# Patient Record
Sex: FEMALE | URBAN - NONMETROPOLITAN AREA
[De-identification: names, ages, dates, MRNs, and addresses within clinical notes are randomized per-mention and may not be internally consistent; named-entity substitution may affect disease eponyms.]

---

## 2022-08-03 ENCOUNTER — APPOINTMENT (RX ONLY)
Dept: URBAN - NONMETROPOLITAN AREA CLINIC 25 | Facility: CLINIC | Age: 62
Setting detail: DERMATOLOGY
End: 2022-08-03

## 2022-08-03 DIAGNOSIS — Z00.8 ENCOUNTER FOR OTHER GENERAL EXAMINATION: ICD-10-CM

## 2022-08-03 DIAGNOSIS — L60.8 OTHER NAIL DISORDERS: ICD-10-CM

## 2022-08-03 PROBLEM — L60.3 NAIL DYSTROPHY: Status: ACTIVE | Noted: 2022-08-03

## 2022-08-03 PROCEDURE — 11104 PUNCH BX SKIN SINGLE LESION: CPT

## 2022-08-03 PROCEDURE — ? ADDITIONAL NOTES

## 2022-08-03 PROCEDURE — ? OTHER

## 2022-08-03 PROCEDURE — ? COUNSELING

## 2022-08-03 PROCEDURE — ? BIOPSY BY PUNCH METHOD

## 2022-08-03 PROCEDURE — 99203 OFFICE O/P NEW LOW 30 MIN: CPT | Mod: 25

## 2022-08-03 PROCEDURE — ? PRESCRIPTION

## 2022-08-03 PROCEDURE — ? TREATMENT REGIMEN

## 2022-08-03 RX ORDER — GENTAMICIN SULFATE 1 MG/G
OINTMENT TOPICAL
Qty: 30 | Refills: 2 | Status: ERX | COMMUNITY
Start: 2022-08-03

## 2022-08-03 RX ADMIN — GENTAMICIN SULFATE: 1 OINTMENT TOPICAL at 00:00

## 2022-08-03 ASSESSMENT — LOCATION DETAILED DESCRIPTION DERM: LOCATION DETAILED: LEFT RING FINGERNAIL

## 2022-08-03 ASSESSMENT — LOCATION SIMPLE DESCRIPTION DERM: LOCATION SIMPLE: LEFT RING FINGERNAIL

## 2022-08-03 ASSESSMENT — LOCATION ZONE DERM: LOCATION ZONE: FINGERNAIL

## 2022-08-03 NOTE — PROCEDURE: TREATMENT REGIMEN
Detail Level: Zone
Initiate Treatment: Gentamicin apply twice daily under the nail daily until resolved

## 2022-08-03 NOTE — PROCEDURE: MIPS QUALITY
Quality 431: Preventive Care And Screening: Unhealthy Alcohol Use - Screening: Patient not identified as an unhealthy alcohol user when screened for unhealthy alcohol use using a systematic screening method
Quality 110: Preventive Care And Screening: Influenza Immunization: Influenza Immunization previously received during influenza season
Quality 226: Preventive Care And Screening: Tobacco Use: Screening And Cessation Intervention: Patient screened for tobacco use and is an ex/non-smoker
Quality 111:Pneumonia Vaccination Status For Older Adults: Pneumococcal vaccine administered on or after patient’s 60th birthday and before the end of the measurement period
Detail Level: Detailed
Quality 130: Documentation Of Current Medications In The Medical Record: Current Medications Documented

## 2022-08-03 NOTE — HPI: NAIL DYSTROPHY
How Severe Is It?: severe
Is This A New Presentation, Or A Follow-Up?: Nail Dystrophy
Additional History: Nail was removed in January 2022 at Urgent Care due to traumatic event that caused the nail to be partially off. This is how the nail grew back

## 2022-08-03 NOTE — PROCEDURE: BIOPSY BY PUNCH METHOD
Detail Level: Detailed
Was A Bandage Applied: Yes
Punch Size In Mm: 3
Biopsy Type: H and E
Anesthesia Type: 1% lidocaine with epinephrine
Anesthesia Volume In Cc (Will Not Render If 0): 0.5
Additional Anesthesia Volume In Cc (Will Not Render If 0): 0
Hemostasis: None
Epidermal Sutures: none, closed by secondary intention
Wound Care: Petrolatum
Dressing: bandage
Patient Will Remove Sutures At Home?: No
Lab: [id number]
Lab Facility: [id number]
Consent: Written consent was obtained and risks were reviewed including but not limited to scarring, infection, bleeding, scabbing, incomplete removal, nerve damage and allergy to anesthesia.
Post-Care Instructions: I reviewed with the patient in detail post-care instructions. Patient is to keep the biopsy site dry overnight, and then apply bacitracin twice daily until healed. Patient may apply hydrogen peroxide soaks to remove any crusting.
Home Suture Removal Text: Patient was provided a home suture removal kit and will remove their sutures at home.  If they have any questions or difficulties they will call the office.
Notification Instructions: Patient will be notified of biopsy results. However, patient instructed to call the office if not contacted within 2 weeks.
Billing Type: Third-Party Bill
Information: Selecting Yes will display possible errors in your note based on the variables you have selected. This validation is only offered as a suggestion for you. PLEASE NOTE THAT THE VALIDATION TEXT WILL BE REMOVED WHEN YOU FINALIZE YOUR NOTE. IF YOU WANT TO FAX A PRELIMINARY NOTE YOU WILL NEED TO TOGGLE THIS TO 'NO' IF YOU DO NOT WANT IT IN YOUR FAXED NOTE.

## 2022-08-03 NOTE — PROCEDURE: ADDITIONAL NOTES
Additional Notes: \\nDiscussed ddx and treatment options at length.  Patient admits to traumatizing nail ~7-8 months ago, ultimately requiring avulsion of nail.  The nail plate regrew appropriately, but she has developed a tender, hyperpigmented patch on the right, mid nail plate.  We discussed repeat avulsion vs punch through the nail plate for definitive diagnosis, and together agreed to perform punch through the nail plate.  Immediately upon penetrating through the nail plate, a subungual hematoma was released with a healthy appearing nail bed beneath.  Nail plate submitted for pathology.
Render Risk Assessment In Note?: no
Detail Level: Simple

## 2022-08-09 ENCOUNTER — APPOINTMENT (RX ONLY)
Dept: URBAN - NONMETROPOLITAN AREA CLINIC 25 | Facility: CLINIC | Age: 62
Setting detail: DERMATOLOGY
End: 2022-08-09

## 2022-08-09 DIAGNOSIS — Z00.8 ENCOUNTER FOR OTHER GENERAL EXAMINATION: ICD-10-CM

## 2022-08-09 DIAGNOSIS — Z48.817 ENCOUNTER FOR SURGICAL AFTERCARE FOLLOWING SURGERY ON THE SKIN AND SUBCUTANEOUS TISSUE: ICD-10-CM

## 2022-08-09 PROCEDURE — ? COUNSELING

## 2022-08-09 PROCEDURE — ? OTHER

## 2022-08-09 PROCEDURE — 99024 POSTOP FOLLOW-UP VISIT: CPT

## 2022-08-09 PROCEDURE — ? POST-OP WOUND CHECK

## 2022-08-09 PROCEDURE — ? PRESCRIPTION

## 2022-08-09 RX ORDER — GENTAMICIN SULFATE 1 MG/G
OINTMENT TOPICAL
Qty: 15 | Refills: 2 | Status: ERX

## 2022-08-09 ASSESSMENT — LOCATION SIMPLE DESCRIPTION DERM: LOCATION SIMPLE: LEFT THUMBNAIL

## 2022-08-09 ASSESSMENT — LOCATION ZONE DERM: LOCATION ZONE: FINGERNAIL

## 2022-08-09 ASSESSMENT — LOCATION DETAILED DESCRIPTION DERM: LOCATION DETAILED: LEFT THUMBNAIL

## 2022-08-09 NOTE — PROCEDURE: POST-OP WOUND CHECK
Detail Level: Detailed
Add 43982 Cpt? (Important Note: In 2017 The Use Of 04107 Is Being Tracked By Cms To Determine Future Global Period Reimbursement For Global Periods): yes
Wound Evaluated By: Dr. Cohn

## 2022-08-11 ENCOUNTER — RX ONLY (OUTPATIENT)
Age: 62
Setting detail: RX ONLY
End: 2022-08-11

## 2022-08-11 RX ORDER — GENTAMICIN SULFATE 1 MG/G
OINTMENT TOPICAL
Qty: 30 | Refills: 2 | Status: ERX

## 2023-02-21 ENCOUNTER — APPOINTMENT (RX ONLY)
Dept: URBAN - NONMETROPOLITAN AREA CLINIC 25 | Facility: CLINIC | Age: 63
Setting detail: DERMATOLOGY
End: 2023-02-21

## 2023-02-21 DIAGNOSIS — L60.8 OTHER NAIL DISORDERS: ICD-10-CM | Status: INADEQUATELY CONTROLLED

## 2023-02-21 PROCEDURE — ? COUNSELING

## 2023-02-21 PROCEDURE — 99213 OFFICE O/P EST LOW 20 MIN: CPT

## 2023-02-21 PROCEDURE — ? TREATMENT REGIMEN

## 2023-02-21 ASSESSMENT — LOCATION SIMPLE DESCRIPTION DERM: LOCATION SIMPLE: LEFT THUMBNAIL

## 2023-02-21 ASSESSMENT — NAIL INVOLVEMENT PERCENT: % OF NAIL(S) INVOLVED: 10

## 2023-02-21 ASSESSMENT — LOCATION ZONE DERM: LOCATION ZONE: FINGERNAIL

## 2023-02-21 ASSESSMENT — LOCATION DETAILED DESCRIPTION DERM: LOCATION DETAILED: LEFT THUMBNAIL

## 2023-02-21 NOTE — PROCEDURE: TREATMENT REGIMEN
Detail Level: Detailed
Otc Regimen: 50/50 Hydrogen peroxide and isopropyl alcohol apply medication each time hands are exposed to water.

## 2023-04-05 ENCOUNTER — HOSPITAL ENCOUNTER (INPATIENT)
Facility: HOSPITAL | Age: 63
LOS: 8 days | Discharge: HOME HEALTH | DRG: 871 | End: 2023-04-13
Attending: STUDENT IN AN ORGANIZED HEALTH CARE EDUCATION/TRAINING PROGRAM | Admitting: INTERNAL MEDICINE
Payer: MEDICARE

## 2023-04-05 DIAGNOSIS — R10.84 GENERALIZED ABDOMINAL PAIN: Primary | ICD-10-CM

## 2023-04-05 DIAGNOSIS — R57.9 SHOCK: ICD-10-CM

## 2023-04-05 DIAGNOSIS — A41.9 SEPSIS, DUE TO UNSPECIFIED ORGANISM, UNSPECIFIED WHETHER ACUTE ORGAN DYSFUNCTION PRESENT: ICD-10-CM

## 2023-04-05 DIAGNOSIS — A04.72 C. DIFFICILE COLITIS: ICD-10-CM

## 2023-04-05 PROBLEM — R19.7 DIARRHEA: Status: ACTIVE | Noted: 2023-04-05

## 2023-04-05 PROBLEM — E87.1 HYPONATREMIA: Status: ACTIVE | Noted: 2023-04-05

## 2023-04-05 PROBLEM — D64.9 ANEMIA: Status: ACTIVE | Noted: 2023-04-05

## 2023-04-05 PROBLEM — N17.9 AKI (ACUTE KIDNEY INJURY): Status: ACTIVE | Noted: 2023-04-05

## 2023-04-05 PROBLEM — Z98.1 S/P LUMBAR FUSION: Status: ACTIVE | Noted: 2023-04-05

## 2023-04-05 PROBLEM — E88.09 HYPOALBUMINEMIA: Status: ACTIVE | Noted: 2023-04-05

## 2023-04-05 PROBLEM — E83.39 HYPERPHOSPHATEMIA: Status: ACTIVE | Noted: 2023-04-05

## 2023-04-05 PROBLEM — E87.29 INCREASED ANION GAP METABOLIC ACIDOSIS: Status: ACTIVE | Noted: 2023-04-05

## 2023-04-05 LAB
ALBUMIN SERPL BCP-MCNC: 1.9 G/DL (ref 3.5–5.2)
ALLENS TEST: ABNORMAL
ALLENS TEST: NORMAL
ALP SERPL-CCNC: 99 U/L (ref 55–135)
ALT SERPL W/O P-5'-P-CCNC: 39 U/L (ref 10–44)
ANION GAP SERPL CALC-SCNC: 17 MMOL/L (ref 8–16)
ANISOCYTOSIS BLD QL SMEAR: SLIGHT
AST SERPL-CCNC: 64 U/L (ref 10–40)
BACTERIA #/AREA URNS AUTO: ABNORMAL /HPF
BASOPHILS # BLD AUTO: 0.1 K/UL (ref 0–0.2)
BASOPHILS NFR BLD: 0.8 % (ref 0–1.9)
BILIRUB SERPL-MCNC: 0.5 MG/DL (ref 0.1–1)
BILIRUB UR QL STRIP: NEGATIVE
BUN SERPL-MCNC: 117 MG/DL (ref 8–23)
BURR CELLS BLD QL SMEAR: ABNORMAL
CALCIUM SERPL-MCNC: 8.2 MG/DL (ref 8.7–10.5)
CHLORIDE SERPL-SCNC: 90 MMOL/L (ref 95–110)
CLARITY UR REFRACT.AUTO: ABNORMAL
CO2 SERPL-SCNC: 16 MMOL/L (ref 23–29)
COLOR UR AUTO: YELLOW
CREAT SERPL-MCNC: 7 MG/DL (ref 0.5–1.4)
DACRYOCYTES BLD QL SMEAR: ABNORMAL
DIFFERENTIAL METHOD: ABNORMAL
EOSINOPHIL # BLD AUTO: 0 K/UL (ref 0–0.5)
EOSINOPHIL NFR BLD: 0 % (ref 0–8)
ERYTHROCYTE [DISTWIDTH] IN BLOOD BY AUTOMATED COUNT: 14 % (ref 11.5–14.5)
EST. GFR  (NO RACE VARIABLE): 6.1 ML/MIN/1.73 M^2
GLUCOSE SERPL-MCNC: 109 MG/DL (ref 70–110)
GLUCOSE UR QL STRIP: NEGATIVE
HCO3 UR-SCNC: 17.4 MMOL/L (ref 24–28)
HCT VFR BLD AUTO: 31.1 % (ref 37–48.5)
HCV AB SERPL QL IA: NORMAL
HGB BLD-MCNC: 10.3 G/DL (ref 12–16)
HGB UR QL STRIP: ABNORMAL
HIV 1+2 AB+HIV1 P24 AG SERPL QL IA: NORMAL
HYALINE CASTS UR QL AUTO: 9 /LPF
HYPOCHROMIA BLD QL SMEAR: ABNORMAL
IMM GRANULOCYTES # BLD AUTO: 0.62 K/UL (ref 0–0.04)
IMM GRANULOCYTES NFR BLD AUTO: 4.7 % (ref 0–0.5)
INFLUENZA A, MOLECULAR: NEGATIVE
INFLUENZA B, MOLECULAR: NEGATIVE
KETONES UR QL STRIP: NEGATIVE
LACTATE SERPL-SCNC: 2.2 MMOL/L (ref 0.5–2.2)
LACTATE SERPL-SCNC: 2.4 MMOL/L (ref 0.5–2.2)
LDH SERPL L TO P-CCNC: 1.98 MMOL/L (ref 0.5–2.2)
LEUKOCYTE ESTERASE UR QL STRIP: NEGATIVE
LIPASE SERPL-CCNC: 9 U/L (ref 4–60)
LYMPHOCYTES # BLD AUTO: 0.9 K/UL (ref 1–4.8)
LYMPHOCYTES NFR BLD: 6.5 % (ref 18–48)
MAGNESIUM SERPL-MCNC: 2.1 MG/DL (ref 1.6–2.6)
MCH RBC QN AUTO: 25.8 PG (ref 27–31)
MCHC RBC AUTO-ENTMCNC: 33.1 G/DL (ref 32–36)
MCV RBC AUTO: 78 FL (ref 82–98)
MICROSCOPIC COMMENT: ABNORMAL
MONOCYTES # BLD AUTO: 1.1 K/UL (ref 0.3–1)
MONOCYTES NFR BLD: 8.5 % (ref 4–15)
NEUTROPHILS # BLD AUTO: 10.5 K/UL (ref 1.8–7.7)
NEUTROPHILS NFR BLD: 79.5 % (ref 38–73)
NITRITE UR QL STRIP: NEGATIVE
NRBC BLD-RTO: 0 /100 WBC
OSMOLALITY UR: 307 MOSM/KG (ref 50–1200)
OVALOCYTES BLD QL SMEAR: ABNORMAL
PCO2 BLDA: 31.9 MMHG (ref 35–45)
PH SMN: 7.34 [PH] (ref 7.35–7.45)
PH UR STRIP: 5 [PH] (ref 5–8)
PHOSPHATE SERPL-MCNC: 6.1 MG/DL (ref 2.7–4.5)
PLATELET # BLD AUTO: 383 K/UL (ref 150–450)
PLATELET BLD QL SMEAR: ABNORMAL
PMV BLD AUTO: 11.8 FL (ref 9.2–12.9)
PO2 BLDA: 28 MMHG (ref 40–60)
POC BE: -8 MMOL/L
POC SATURATED O2: 51 % (ref 95–100)
POC TCO2: 18 MMOL/L (ref 24–29)
POIKILOCYTOSIS BLD QL SMEAR: SLIGHT
POLYCHROMASIA BLD QL SMEAR: ABNORMAL
POTASSIUM SERPL-SCNC: 4.2 MMOL/L (ref 3.5–5.1)
PROT SERPL-MCNC: 5.8 G/DL (ref 6–8.4)
PROT UR QL STRIP: ABNORMAL
RBC # BLD AUTO: 3.99 M/UL (ref 4–5.4)
RBC #/AREA URNS AUTO: 1 /HPF (ref 0–4)
SAMPLE: ABNORMAL
SAMPLE: NORMAL
SARS-COV-2 RDRP RESP QL NAA+PROBE: NEGATIVE
SITE: ABNORMAL
SITE: NORMAL
SODIUM SERPL-SCNC: 123 MMOL/L (ref 136–145)
SODIUM UR-SCNC: <10 MMOL/L (ref 20–250)
SP GR UR STRIP: 1.01 (ref 1–1.03)
SPECIMEN SOURCE: NORMAL
T4 FREE SERPL-MCNC: 0.8 NG/DL (ref 0.71–1.51)
TSH SERPL DL<=0.005 MIU/L-ACNC: 0.37 UIU/ML (ref 0.4–4)
URN SPEC COLLECT METH UR: ABNORMAL
WBC # BLD AUTO: 13.15 K/UL (ref 3.9–12.7)
WBC #/AREA URNS AUTO: 1 /HPF (ref 0–5)

## 2023-04-05 PROCEDURE — 63600175 PHARM REV CODE 636 W HCPCS: Performed by: STUDENT IN AN ORGANIZED HEALTH CARE EDUCATION/TRAINING PROGRAM

## 2023-04-05 PROCEDURE — 84300 ASSAY OF URINE SODIUM: CPT | Performed by: STUDENT IN AN ORGANIZED HEALTH CARE EDUCATION/TRAINING PROGRAM

## 2023-04-05 PROCEDURE — 81001 URINALYSIS AUTO W/SCOPE: CPT | Performed by: STUDENT IN AN ORGANIZED HEALTH CARE EDUCATION/TRAINING PROGRAM

## 2023-04-05 PROCEDURE — U0002 COVID-19 LAB TEST NON-CDC: HCPCS

## 2023-04-05 PROCEDURE — 83605 ASSAY OF LACTIC ACID: CPT | Mod: 91

## 2023-04-05 PROCEDURE — 20000000 HC ICU ROOM

## 2023-04-05 PROCEDURE — 99900035 HC TECH TIME PER 15 MIN (STAT)

## 2023-04-05 PROCEDURE — 87389 HIV-1 AG W/HIV-1&-2 AB AG IA: CPT | Performed by: PHYSICIAN ASSISTANT

## 2023-04-05 PROCEDURE — 84439 ASSAY OF FREE THYROXINE: CPT | Performed by: STUDENT IN AN ORGANIZED HEALTH CARE EDUCATION/TRAINING PROGRAM

## 2023-04-05 PROCEDURE — 87502 INFLUENZA DNA AMP PROBE: CPT

## 2023-04-05 PROCEDURE — 63600175 PHARM REV CODE 636 W HCPCS: Performed by: NURSE PRACTITIONER

## 2023-04-05 PROCEDURE — 84100 ASSAY OF PHOSPHORUS: CPT | Performed by: STUDENT IN AN ORGANIZED HEALTH CARE EDUCATION/TRAINING PROGRAM

## 2023-04-05 PROCEDURE — 99285 EMERGENCY DEPT VISIT HI MDM: CPT | Mod: CS,,, | Performed by: STUDENT IN AN ORGANIZED HEALTH CARE EDUCATION/TRAINING PROGRAM

## 2023-04-05 PROCEDURE — 84443 ASSAY THYROID STIM HORMONE: CPT | Performed by: STUDENT IN AN ORGANIZED HEALTH CARE EDUCATION/TRAINING PROGRAM

## 2023-04-05 PROCEDURE — 27000207 HC ISOLATION

## 2023-04-05 PROCEDURE — S0030 INJECTION, METRONIDAZOLE: HCPCS

## 2023-04-05 PROCEDURE — 96361 HYDRATE IV INFUSION ADD-ON: CPT

## 2023-04-05 PROCEDURE — 83605 ASSAY OF LACTIC ACID: CPT

## 2023-04-05 PROCEDURE — 87040 BLOOD CULTURE FOR BACTERIA: CPT | Mod: 59 | Performed by: STUDENT IN AN ORGANIZED HEALTH CARE EDUCATION/TRAINING PROGRAM

## 2023-04-05 PROCEDURE — 25000003 PHARM REV CODE 250: Performed by: NURSE PRACTITIONER

## 2023-04-05 PROCEDURE — 96374 THER/PROPH/DIAG INJ IV PUSH: CPT

## 2023-04-05 PROCEDURE — 82803 BLOOD GASES ANY COMBINATION: CPT

## 2023-04-05 PROCEDURE — 86803 HEPATITIS C AB TEST: CPT | Performed by: PHYSICIAN ASSISTANT

## 2023-04-05 PROCEDURE — 83735 ASSAY OF MAGNESIUM: CPT | Performed by: STUDENT IN AN ORGANIZED HEALTH CARE EDUCATION/TRAINING PROGRAM

## 2023-04-05 PROCEDURE — 85025 COMPLETE CBC W/AUTO DIFF WBC: CPT

## 2023-04-05 PROCEDURE — 83605 ASSAY OF LACTIC ACID: CPT | Performed by: STUDENT IN AN ORGANIZED HEALTH CARE EDUCATION/TRAINING PROGRAM

## 2023-04-05 PROCEDURE — 99285 EMERGENCY DEPT VISIT HI MDM: CPT | Mod: 25

## 2023-04-05 PROCEDURE — 25000003 PHARM REV CODE 250: Performed by: STUDENT IN AN ORGANIZED HEALTH CARE EDUCATION/TRAINING PROGRAM

## 2023-04-05 PROCEDURE — 83935 ASSAY OF URINE OSMOLALITY: CPT | Performed by: STUDENT IN AN ORGANIZED HEALTH CARE EDUCATION/TRAINING PROGRAM

## 2023-04-05 PROCEDURE — 99285 PR EMERGENCY DEPT VISIT,LEVEL V: ICD-10-PCS | Mod: CS,,, | Performed by: STUDENT IN AN ORGANIZED HEALTH CARE EDUCATION/TRAINING PROGRAM

## 2023-04-05 PROCEDURE — 51702 INSERT TEMP BLADDER CATH: CPT

## 2023-04-05 PROCEDURE — 80053 COMPREHEN METABOLIC PANEL: CPT | Performed by: STUDENT IN AN ORGANIZED HEALTH CARE EDUCATION/TRAINING PROGRAM

## 2023-04-05 PROCEDURE — 25000003 PHARM REV CODE 250

## 2023-04-05 PROCEDURE — 83690 ASSAY OF LIPASE: CPT | Performed by: STUDENT IN AN ORGANIZED HEALTH CARE EDUCATION/TRAINING PROGRAM

## 2023-04-05 RX ORDER — SODIUM CHLORIDE 9 MG/ML
INJECTION, SOLUTION INTRAVENOUS CONTINUOUS
Status: DISCONTINUED | OUTPATIENT
Start: 2023-04-05 | End: 2023-04-05

## 2023-04-05 RX ORDER — NOREPINEPHRINE BITARTRATE/D5W 4MG/250ML
0-.2 PLASTIC BAG, INJECTION (ML) INTRAVENOUS CONTINUOUS
Status: DISPENSED | OUTPATIENT
Start: 2023-04-05 | End: 2023-04-06

## 2023-04-05 RX ORDER — ONDANSETRON 2 MG/ML
4 INJECTION INTRAMUSCULAR; INTRAVENOUS EVERY 8 HOURS PRN
Status: DISCONTINUED | OUTPATIENT
Start: 2023-04-05 | End: 2023-04-13 | Stop reason: HOSPADM

## 2023-04-05 RX ORDER — SODIUM CHLORIDE 0.9 % (FLUSH) 0.9 %
10 SYRINGE (ML) INJECTION
Status: DISCONTINUED | OUTPATIENT
Start: 2023-04-05 | End: 2023-04-13 | Stop reason: HOSPADM

## 2023-04-05 RX ORDER — SODIUM CHLORIDE, SODIUM LACTATE, POTASSIUM CHLORIDE, CALCIUM CHLORIDE 600; 310; 30; 20 MG/100ML; MG/100ML; MG/100ML; MG/100ML
INJECTION, SOLUTION INTRAVENOUS CONTINUOUS
Status: DISCONTINUED | OUTPATIENT
Start: 2023-04-05 | End: 2023-04-11

## 2023-04-05 RX ORDER — NOREPINEPHRINE BITARTRATE/D5W 4MG/250ML
0-3 PLASTIC BAG, INJECTION (ML) INTRAVENOUS CONTINUOUS
Status: DISCONTINUED | OUTPATIENT
Start: 2023-04-05 | End: 2023-04-05

## 2023-04-05 RX ORDER — METRONIDAZOLE 500 MG/100ML
500 INJECTION, SOLUTION INTRAVENOUS
Status: DISCONTINUED | OUTPATIENT
Start: 2023-04-05 | End: 2023-04-05

## 2023-04-05 RX ADMIN — NOREPINEPHRINE BITARTRATE 0.08 MCG/KG/MIN: 4 INJECTION, SOLUTION INTRAVENOUS at 09:04

## 2023-04-05 RX ADMIN — METRONIDAZOLE 500 MG: 500 INJECTION, SOLUTION INTRAVENOUS at 06:04

## 2023-04-05 RX ADMIN — ONDANSETRON 4 MG: 2 INJECTION INTRAMUSCULAR; INTRAVENOUS at 10:04

## 2023-04-05 RX ADMIN — SODIUM CHLORIDE, POTASSIUM CHLORIDE, SODIUM LACTATE AND CALCIUM CHLORIDE 1000 ML: 600; 310; 30; 20 INJECTION, SOLUTION INTRAVENOUS at 05:04

## 2023-04-05 RX ADMIN — SODIUM CHLORIDE, POTASSIUM CHLORIDE, SODIUM LACTATE AND CALCIUM CHLORIDE 1000 ML: 600; 310; 30; 20 INJECTION, SOLUTION INTRAVENOUS at 07:04

## 2023-04-05 RX ADMIN — SODIUM CHLORIDE, POTASSIUM CHLORIDE, SODIUM LACTATE AND CALCIUM CHLORIDE: 600; 310; 30; 20 INJECTION, SOLUTION INTRAVENOUS at 10:04

## 2023-04-05 RX ADMIN — NOREPINEPHRINE BITARTRATE 0.05 MCG/KG/MIN: 4 INJECTION, SOLUTION INTRAVENOUS at 07:04

## 2023-04-05 RX ADMIN — PIPERACILLIN SODIUM AND TAZOBACTAM SODIUM 4.5 G: 4; .5 INJECTION, POWDER, FOR SOLUTION INTRAVENOUS at 08:04

## 2023-04-05 NOTE — ED PROVIDER NOTES
Encounter Date: 4/5/2023     History     Chief Complaint   Patient presents with    Abdominal Pain     Recent back surgery on 3/30, now having nausea and weakness.      Ms. Zelaya is a 63 YOF with HTN, WILMA, and recent L4-5 posterior decompression and fusion on 3/20 in [place], [place] for chronic LBP. Pt is coming in for abdominal pain, diarrhea, and delayed speech. Pt presents with daughter who provides part of history. Abdominal pain onset 5:30 AM today, location epigastric, described as squeezing, intensity 8/10. Pt is having 6-7 BM per day, consistency liquid, no blood. Pt had L4-5 posterior decompression and fusion on 3/20, was hospitalized from 3/20-3/27, was at rehab facility 3/27-3/31. Pt then living with faua in [facility] since. At rehab facility several members at the facility contracted diarrhea and had to be admitted.  ROS positive for decreased PO intake (last ate food and medications 5:30 PM last night), sore throat, and urinary frequency. ROS negative for hematuria, urgency, CP, SOB, palpitations, N/V, dizziness, lightheadedness, tunnel vision, LOC, cough, fevers, chills.     The history is provided by the patient and a relative. No  was used.   Review of patient's allergies indicates:  No Known Allergies  History reviewed. No pertinent past medical history.  Past Surgical History:   Procedure Laterality Date    BACK SURGERY      CHOLECYSTECTOMY       History reviewed. No pertinent family history.  Social History     Tobacco Use    Smoking status: Never    Smokeless tobacco: Never   Substance Use Topics    Alcohol use: Never     Review of Systems    Physical Exam     Initial Vitals [04/05/23 1338]   BP Pulse Resp Temp SpO2   (!) 121/55 107 20 98.2 °F (36.8 °C) 98 %      MAP       --         Physical Exam    Constitutional: She appears lethargic. She is cooperative. She appears ill.   HENT:   Head: Normocephalic and atraumatic.   Eyes: Right eye exhibits no discharge. Left eye exhibits  no discharge.   Neck:   Normal range of motion.  Cardiovascular:  Normal rate and regular rhythm.           Pulmonary/Chest: Breath sounds normal.   Abdominal: Abdomen is soft. She exhibits no distension and no mass. There is abdominal tenderness (diffuse tenderness to palpation with guarding). There is rebound and guarding.   Musculoskeletal:         General: No edema.      Cervical back: Normal range of motion.     Neurological: She is oriented to person, place, and time. She has normal strength. She appears lethargic. She displays no tremor. She exhibits normal muscle tone.   Skin: Skin is warm and dry.   Psychiatric: She has a normal mood and affect. Thought content normal.       ED Course   Procedures  Labs Reviewed   INFLUENZA A & B BY MOLECULAR   CLOSTRIDIUM DIFFICILE   HIV 1 / 2 ANTIBODY   HEPATITIS C ANTIBODY   CBC W/ AUTO DIFFERENTIAL   COMPREHENSIVE METABOLIC PANEL   SARS-COV-2 RNA AMPLIFICATION, QUAL   LIPASE   URINALYSIS, REFLEX TO URINE CULTURE   LACTIC ACID, PLASMA   TSH   MAGNESIUM   PHOSPHORUS   ISTAT CHEM8          Imaging Results    None          Medications   lactated ringers bolus 1,000 mL (has no administration in time range)   metronidazole IVPB 500 mg (has no administration in time range)     Medical Decision Making:   Initial Assessment:   Pt is afebrile, tachycardic, and normotensive. Patient's presentation is concerning for dehydration. Patient is a slow responder but is AAOx3.   Differential Diagnosis:   Dehydration 2/2 diarrhea and poor PO intake  Diarrhea from c diff vs enterocolitis vs covid-19 infection   Possible UTI given increased urinary frequency   Clinical Tests:   Lab Tests: Ordered  Radiological Study: Ordered  Medical Tests: Ordered  ED Management:  Pt was started on IVF for dehydration. CBC, CMP, Mag, Phos ordered to evaluate electrolyte abnormalities 2/2 dehydration. CTAP ordered to evaluate anatomy given guarding on exam.     Will sign off patient to oncoming ED team.                          Clinical Impression:   Final diagnoses:  [R10.84] Generalized abdominal pain (Primary)               Tabitha Wheeler DO  Resident  04/05/23 1640

## 2023-04-05 NOTE — ED PROVIDER NOTES
ED Physician Hand-off Note:    ED Course: I assumed care of patient from off-going ED physician, .  Briefly, Patient is a 63-year-old female with past medical history of hypertension and recent L4-L5 posterior decompression presenting with abdominal pain and diarrhea.  She is been having epigastric pain accompanied with 6-7 episodes of diarrhea.  There has been recent antibiotic use.  Patient was recently in a rehab facility where multiple people there has had diarrhea.    At the time of signout plan was pending CBC, CMP, CT abdomen, C diff, COVID screen.  CBC significant for leukocytosis of 13,000. Lactic acid elevated at 2.4.  Evaluated the patient became patient became hypotensive with a map of 63.  Patient moved to room 3.  Will administer additional 1 L bolus.  CMP significant for hyponatremia of 123, creatinine of 7, BUN of 113.  Daughter reports that she has a history of renal dysfunction however is unaware of her baseline creatinine.  There have not been discussions about starting dialysis for this patient per the patient's daughter.  Broaden antibiotics to vanc and Zosyn.  Ordered urine sodium, urine osm, serum osm.  Concern for sepsis and hypovolemic shock.  Placed Gong to monitor urine output, patient has not made urine since being in the ED however daughter reports that she normally does make urine.  Started peripheral norepinephrine improved blood pressure.  Patient has been admitted to medical ICU.    Disposition:  Admitted to MICU      Impression:  Septic shock    Final diagnoses:  [R10.84] Generalized abdominal pain (Primary)  [A41.9] Sepsis, due to unspecified organism, unspecified whether acute organ dysfunction present  [R57.9] Shock           Jose Antonio Horowitz DO  Resident  04/05/23 2039

## 2023-04-05 NOTE — ED NOTES
APPEARANCE: awake and alert in NAD. Pt resting in stretcher.   SKIN: warm, dry and intact. No breakdown or bruising.  MUSCULOSKELETAL: Patient moving all extremities spontaneously, no obvious swelling or deformities noted. Pt reports lower back pain.  RESPIRATORY: Denies shortness of breath. Respirations unlabored. On RA.  CARDIAC: Denies CP, 2+ distal pulses; no peripheral edema  ABDOMEN: S/ND/NT, Denies nausea/vomiting/diarrhea. Pt reports indigestion and abdominal pain.  : voids spontaneously, denies difficulty.  Neurologic: AAO x 4; follows commands equal strength in all extremities; denies numbness/tingling. Denies dizziness.

## 2023-04-05 NOTE — ED TRIAGE NOTES
Patient comes into the emergency department by POV with complaints of abdominal and back pain. Patient states that she had back surgery last year, was doing physical therapy, but now she is in pain. Patient denies HA, SOB, N/V, fever.

## 2023-04-06 LAB
ALBUMIN SERPL BCP-MCNC: 1.7 G/DL (ref 3.5–5.2)
ALBUMIN SERPL BCP-MCNC: 1.8 G/DL (ref 3.5–5.2)
ALBUMIN SERPL BCP-MCNC: 1.8 G/DL (ref 3.5–5.2)
ALLENS TEST: ABNORMAL
ALP SERPL-CCNC: 94 U/L (ref 55–135)
ALP SERPL-CCNC: 97 U/L (ref 55–135)
ALT SERPL W/O P-5'-P-CCNC: 35 U/L (ref 10–44)
ALT SERPL W/O P-5'-P-CCNC: 35 U/L (ref 10–44)
ANION GAP SERPL CALC-SCNC: 16 MMOL/L (ref 8–16)
ANION GAP SERPL CALC-SCNC: 16 MMOL/L (ref 8–16)
ANION GAP SERPL CALC-SCNC: 18 MMOL/L (ref 8–16)
ANISOCYTOSIS BLD QL SMEAR: SLIGHT
AST SERPL-CCNC: 54 U/L (ref 10–40)
AST SERPL-CCNC: 58 U/L (ref 10–40)
BASOPHILS # BLD AUTO: 0.02 K/UL (ref 0–0.2)
BASOPHILS NFR BLD: 0.2 % (ref 0–1.9)
BILIRUB SERPL-MCNC: 0.5 MG/DL (ref 0.1–1)
BILIRUB SERPL-MCNC: 0.6 MG/DL (ref 0.1–1)
BUN SERPL-MCNC: 102 MG/DL (ref 8–23)
BUN SERPL-MCNC: 110 MG/DL (ref 8–23)
BUN SERPL-MCNC: 113 MG/DL (ref 8–23)
BURR CELLS BLD QL SMEAR: ABNORMAL
CALCIUM SERPL-MCNC: 7.8 MG/DL (ref 8.7–10.5)
CALCIUM SERPL-MCNC: 8 MG/DL (ref 8.7–10.5)
CALCIUM SERPL-MCNC: 8.3 MG/DL (ref 8.7–10.5)
CHLORIDE SERPL-SCNC: 94 MMOL/L (ref 95–110)
CHLORIDE SERPL-SCNC: 94 MMOL/L (ref 95–110)
CHLORIDE SERPL-SCNC: 96 MMOL/L (ref 95–110)
CO2 SERPL-SCNC: 14 MMOL/L (ref 23–29)
CO2 SERPL-SCNC: 15 MMOL/L (ref 23–29)
CO2 SERPL-SCNC: 17 MMOL/L (ref 23–29)
CREAT SERPL-MCNC: 3.9 MG/DL (ref 0.5–1.4)
CREAT SERPL-MCNC: 5.1 MG/DL (ref 0.5–1.4)
CREAT SERPL-MCNC: 5.2 MG/DL (ref 0.5–1.4)
DIFFERENTIAL METHOD: ABNORMAL
EOSINOPHIL # BLD AUTO: 0 K/UL (ref 0–0.5)
EOSINOPHIL NFR BLD: 0.1 % (ref 0–8)
ERYTHROCYTE [DISTWIDTH] IN BLOOD BY AUTOMATED COUNT: 13.7 % (ref 11.5–14.5)
EST. GFR  (NO RACE VARIABLE): 12.4 ML/MIN/1.73 M^2
EST. GFR  (NO RACE VARIABLE): 8.8 ML/MIN/1.73 M^2
EST. GFR  (NO RACE VARIABLE): 9 ML/MIN/1.73 M^2
GLUCOSE SERPL-MCNC: 106 MG/DL (ref 70–110)
GLUCOSE SERPL-MCNC: 108 MG/DL (ref 70–110)
GLUCOSE SERPL-MCNC: 99 MG/DL (ref 70–110)
HCO3 UR-SCNC: 21.2 MMOL/L (ref 24–28)
HCT VFR BLD AUTO: 26.5 % (ref 37–48.5)
HGB BLD-MCNC: 9.2 G/DL (ref 12–16)
HYPOCHROMIA BLD QL SMEAR: ABNORMAL
IMM GRANULOCYTES # BLD AUTO: 0.26 K/UL (ref 0–0.04)
IMM GRANULOCYTES NFR BLD AUTO: 3 % (ref 0–0.5)
LACTATE SERPL-SCNC: 2.1 MMOL/L (ref 0.5–2.2)
LYMPHOCYTES # BLD AUTO: 0.5 K/UL (ref 1–4.8)
LYMPHOCYTES NFR BLD: 6.1 % (ref 18–48)
MAGNESIUM SERPL-MCNC: 2.1 MG/DL (ref 1.6–2.6)
MCH RBC QN AUTO: 26.1 PG (ref 27–31)
MCHC RBC AUTO-ENTMCNC: 34.7 G/DL (ref 32–36)
MCV RBC AUTO: 75 FL (ref 82–98)
MONOCYTES # BLD AUTO: 0.6 K/UL (ref 0.3–1)
MONOCYTES NFR BLD: 6.5 % (ref 4–15)
NEUTROPHILS # BLD AUTO: 7.4 K/UL (ref 1.8–7.7)
NEUTROPHILS NFR BLD: 84.1 % (ref 38–73)
NRBC BLD-RTO: 0 /100 WBC
OSMOLALITY SERPL: 304 MOSM/KG (ref 275–295)
OVALOCYTES BLD QL SMEAR: ABNORMAL
PCO2 BLDA: 31.5 MMHG (ref 35–45)
PH SMN: 7.44 [PH] (ref 7.35–7.45)
PHOSPHATE SERPL-MCNC: 5.6 MG/DL (ref 2.7–4.5)
PHOSPHATE SERPL-MCNC: 5.7 MG/DL (ref 2.7–4.5)
PLATELET # BLD AUTO: 293 K/UL (ref 150–450)
PLATELET BLD QL SMEAR: ABNORMAL
PMV BLD AUTO: 10.3 FL (ref 9.2–12.9)
PO2 BLDA: 29 MMHG (ref 40–60)
POC BE: -3 MMOL/L
POC SATURATED O2: 58 % (ref 95–100)
POC TCO2: 22 MMOL/L (ref 24–29)
POIKILOCYTOSIS BLD QL SMEAR: SLIGHT
POLYCHROMASIA BLD QL SMEAR: ABNORMAL
POTASSIUM SERPL-SCNC: 3.7 MMOL/L (ref 3.5–5.1)
POTASSIUM SERPL-SCNC: 3.9 MMOL/L (ref 3.5–5.1)
POTASSIUM SERPL-SCNC: 4.7 MMOL/L (ref 3.5–5.1)
PROCALCITONIN SERPL IA-MCNC: 8.15 NG/ML
PROT SERPL-MCNC: 5.3 G/DL (ref 6–8.4)
PROT SERPL-MCNC: 5.4 G/DL (ref 6–8.4)
RBC # BLD AUTO: 3.53 M/UL (ref 4–5.4)
SAMPLE: ABNORMAL
SITE: ABNORMAL
SODIUM SERPL-SCNC: 125 MMOL/L (ref 136–145)
SODIUM SERPL-SCNC: 126 MMOL/L (ref 136–145)
SODIUM SERPL-SCNC: 129 MMOL/L (ref 136–145)
WBC # BLD AUTO: 8.81 K/UL (ref 3.9–12.7)

## 2023-04-06 PROCEDURE — 25000003 PHARM REV CODE 250: Performed by: CLINICAL NURSE SPECIALIST

## 2023-04-06 PROCEDURE — 80053 COMPREHEN METABOLIC PANEL: CPT | Mod: 91 | Performed by: CLINICAL NURSE SPECIALIST

## 2023-04-06 PROCEDURE — 83605 ASSAY OF LACTIC ACID: CPT | Performed by: CLINICAL NURSE SPECIALIST

## 2023-04-06 PROCEDURE — 25000003 PHARM REV CODE 250: Performed by: STUDENT IN AN ORGANIZED HEALTH CARE EDUCATION/TRAINING PROGRAM

## 2023-04-06 PROCEDURE — 82803 BLOOD GASES ANY COMBINATION: CPT

## 2023-04-06 PROCEDURE — 85025 COMPLETE CBC W/AUTO DIFF WBC: CPT | Performed by: NURSE PRACTITIONER

## 2023-04-06 PROCEDURE — 99291 CRITICAL CARE FIRST HOUR: CPT | Mod: GC,,, | Performed by: INTERNAL MEDICINE

## 2023-04-06 PROCEDURE — 63600175 PHARM REV CODE 636 W HCPCS: Performed by: CLINICAL NURSE SPECIALIST

## 2023-04-06 PROCEDURE — 99291 PR CRITICAL CARE, E/M 30-74 MINUTES: ICD-10-PCS | Mod: GC,,, | Performed by: INTERNAL MEDICINE

## 2023-04-06 PROCEDURE — 84100 ASSAY OF PHOSPHORUS: CPT | Performed by: NURSE PRACTITIONER

## 2023-04-06 PROCEDURE — 63600175 PHARM REV CODE 636 W HCPCS: Performed by: NURSE PRACTITIONER

## 2023-04-06 PROCEDURE — 63600175 PHARM REV CODE 636 W HCPCS: Performed by: INTERNAL MEDICINE

## 2023-04-06 PROCEDURE — 27000207 HC ISOLATION

## 2023-04-06 PROCEDURE — 80069 RENAL FUNCTION PANEL: CPT | Performed by: INTERNAL MEDICINE

## 2023-04-06 PROCEDURE — 94761 N-INVAS EAR/PLS OXIMETRY MLT: CPT

## 2023-04-06 PROCEDURE — 84145 PROCALCITONIN (PCT): CPT | Performed by: CLINICAL NURSE SPECIALIST

## 2023-04-06 PROCEDURE — 25000003 PHARM REV CODE 250: Performed by: INTERNAL MEDICINE

## 2023-04-06 PROCEDURE — 99900035 HC TECH TIME PER 15 MIN (STAT)

## 2023-04-06 PROCEDURE — 83735 ASSAY OF MAGNESIUM: CPT | Performed by: NURSE PRACTITIONER

## 2023-04-06 PROCEDURE — 80053 COMPREHEN METABOLIC PANEL: CPT | Performed by: NURSE PRACTITIONER

## 2023-04-06 PROCEDURE — 25000003 PHARM REV CODE 250: Performed by: NURSE PRACTITIONER

## 2023-04-06 PROCEDURE — 83930 ASSAY OF BLOOD OSMOLALITY: CPT

## 2023-04-06 PROCEDURE — 20000000 HC ICU ROOM

## 2023-04-06 RX ORDER — LIDOCAINE 50 MG/G
1 PATCH TOPICAL DAILY
Status: DISCONTINUED | OUTPATIENT
Start: 2023-04-06 | End: 2023-04-13 | Stop reason: HOSPADM

## 2023-04-06 RX ORDER — ALPRAZOLAM 0.5 MG/1
0.5 TABLET ORAL NIGHTLY PRN
COMMUNITY
Start: 2023-03-10

## 2023-04-06 RX ORDER — DICLOFENAC SODIUM 50 MG/1
50 TABLET, DELAYED RELEASE ORAL 2 TIMES DAILY
Status: ON HOLD | COMMUNITY
Start: 2023-01-07 | End: 2023-04-13 | Stop reason: HOSPADM

## 2023-04-06 RX ORDER — HEPARIN SODIUM 5000 [USP'U]/ML
5000 INJECTION, SOLUTION INTRAVENOUS; SUBCUTANEOUS EVERY 8 HOURS
Status: DISCONTINUED | OUTPATIENT
Start: 2023-04-06 | End: 2023-04-13 | Stop reason: HOSPADM

## 2023-04-06 RX ORDER — LATANOPROST 50 UG/ML
1 SOLUTION/ DROPS OPHTHALMIC NIGHTLY
COMMUNITY
Start: 2023-02-25

## 2023-04-06 RX ORDER — TRAZODONE HYDROCHLORIDE 50 MG/1
50 TABLET ORAL NIGHTLY
COMMUNITY
Start: 2023-02-11

## 2023-04-06 RX ORDER — FENTANYL CITRATE 50 UG/ML
25 INJECTION, SOLUTION INTRAMUSCULAR; INTRAVENOUS
Status: DISCONTINUED | OUTPATIENT
Start: 2023-04-06 | End: 2023-04-06

## 2023-04-06 RX ORDER — OXYCODONE HYDROCHLORIDE 10 MG/1
10 TABLET ORAL EVERY 6 HOURS PRN
Status: DISCONTINUED | OUTPATIENT
Start: 2023-04-06 | End: 2023-04-06

## 2023-04-06 RX ORDER — TIZANIDINE 4 MG/1
4 TABLET ORAL 3 TIMES DAILY
COMMUNITY
Start: 2023-04-04

## 2023-04-06 RX ORDER — LISINOPRIL 10 MG/1
10 TABLET ORAL 2 TIMES DAILY
COMMUNITY
Start: 2023-04-03

## 2023-04-06 RX ORDER — HYDROCHLOROTHIAZIDE 12.5 MG/1
12.5 CAPSULE ORAL DAILY
Status: ON HOLD | COMMUNITY
Start: 2023-03-12 | End: 2023-04-13 | Stop reason: SDUPTHER

## 2023-04-06 RX ORDER — HYDROMORPHONE HYDROCHLORIDE 1 MG/ML
0.5 INJECTION, SOLUTION INTRAMUSCULAR; INTRAVENOUS; SUBCUTANEOUS EVERY 6 HOURS PRN
Status: DISCONTINUED | OUTPATIENT
Start: 2023-04-06 | End: 2023-04-09

## 2023-04-06 RX ORDER — ACETAMINOPHEN 500 MG
1000 TABLET ORAL EVERY 8 HOURS PRN
Status: DISCONTINUED | OUTPATIENT
Start: 2023-04-06 | End: 2023-04-13 | Stop reason: HOSPADM

## 2023-04-06 RX ORDER — OXYCODONE HYDROCHLORIDE 5 MG/1
5 TABLET ORAL EVERY 6 HOURS PRN
Status: DISCONTINUED | OUTPATIENT
Start: 2023-04-06 | End: 2023-04-13 | Stop reason: HOSPADM

## 2023-04-06 RX ORDER — DORZOLAMIDE HCL 20 MG/ML
1 SOLUTION/ DROPS OPHTHALMIC 2 TIMES DAILY
COMMUNITY
Start: 2023-04-01

## 2023-04-06 RX ORDER — SODIUM BICARBONATE 650 MG/1
1300 TABLET ORAL 3 TIMES DAILY
Status: DISCONTINUED | OUTPATIENT
Start: 2023-04-06 | End: 2023-04-08

## 2023-04-06 RX ORDER — ROSUVASTATIN CALCIUM 10 MG/1
10 TABLET, COATED ORAL NIGHTLY
COMMUNITY
Start: 2023-02-09

## 2023-04-06 RX ORDER — GABAPENTIN 300 MG/1
300 CAPSULE ORAL 3 TIMES DAILY
COMMUNITY
Start: 2023-02-22

## 2023-04-06 RX ADMIN — ACETAMINOPHEN 1000 MG: 500 TABLET ORAL at 12:04

## 2023-04-06 RX ADMIN — LIDOCAINE 1 PATCH: 50 PATCH CUTANEOUS at 12:04

## 2023-04-06 RX ADMIN — FENTANYL CITRATE 25 MCG: 50 INJECTION INTRAMUSCULAR; INTRAVENOUS at 07:04

## 2023-04-06 RX ADMIN — HEPARIN SODIUM 5000 UNITS: 5000 INJECTION INTRAVENOUS; SUBCUTANEOUS at 10:04

## 2023-04-06 RX ADMIN — FENTANYL CITRATE 25 MCG: 50 INJECTION INTRAMUSCULAR; INTRAVENOUS at 11:04

## 2023-04-06 RX ADMIN — NOREPINEPHRINE BITARTRATE 0.04 MCG/KG/MIN: 4 INJECTION, SOLUTION INTRAVENOUS at 11:04

## 2023-04-06 RX ADMIN — PIPERACILLIN SODIUM AND TAZOBACTAM SODIUM 4.5 G: 4; .5 INJECTION, POWDER, FOR SOLUTION INTRAVENOUS at 09:04

## 2023-04-06 RX ADMIN — PIPERACILLIN SODIUM AND TAZOBACTAM SODIUM 4.5 G: 4; .5 INJECTION, POWDER, FOR SOLUTION INTRAVENOUS at 08:04

## 2023-04-06 RX ADMIN — ACETAMINOPHEN 1000 MG: 500 TABLET ORAL at 11:04

## 2023-04-06 RX ADMIN — HYDROMORPHONE HYDROCHLORIDE 0.5 MG: 1 INJECTION, SOLUTION INTRAMUSCULAR; INTRAVENOUS; SUBCUTANEOUS at 03:04

## 2023-04-06 RX ADMIN — SODIUM BICARBONATE 1300 MG: 650 TABLET ORAL at 02:04

## 2023-04-06 RX ADMIN — VANCOMYCIN HYDROCHLORIDE 500 MG: KIT at 05:04

## 2023-04-06 RX ADMIN — SODIUM CHLORIDE, POTASSIUM CHLORIDE, SODIUM LACTATE AND CALCIUM CHLORIDE 1000 ML: 600; 310; 30; 20 INJECTION, SOLUTION INTRAVENOUS at 11:04

## 2023-04-06 RX ADMIN — VANCOMYCIN HYDROCHLORIDE 500 MG: KIT at 12:04

## 2023-04-06 RX ADMIN — FENTANYL CITRATE 25 MCG: 50 INJECTION INTRAMUSCULAR; INTRAVENOUS at 03:04

## 2023-04-06 RX ADMIN — SODIUM CHLORIDE, POTASSIUM CHLORIDE, SODIUM LACTATE AND CALCIUM CHLORIDE 1000 ML: 600; 310; 30; 20 INJECTION, SOLUTION INTRAVENOUS at 06:04

## 2023-04-06 RX ADMIN — SODIUM CHLORIDE, POTASSIUM CHLORIDE, SODIUM LACTATE AND CALCIUM CHLORIDE: 600; 310; 30; 20 INJECTION, SOLUTION INTRAVENOUS at 06:04

## 2023-04-06 RX ADMIN — OXYCODONE HYDROCHLORIDE 5 MG: 5 TABLET ORAL at 06:04

## 2023-04-06 RX ADMIN — ACETAMINOPHEN 1000 MG: 500 TABLET ORAL at 02:04

## 2023-04-06 RX ADMIN — VANCOMYCIN HYDROCHLORIDE 1250 MG: 1.25 INJECTION, POWDER, LYOPHILIZED, FOR SOLUTION INTRAVENOUS at 08:04

## 2023-04-06 RX ADMIN — VANCOMYCIN HYDROCHLORIDE 125 MG: KIT at 06:04

## 2023-04-06 RX ADMIN — SODIUM BICARBONATE 1300 MG: 650 TABLET ORAL at 08:04

## 2023-04-06 RX ADMIN — HEPARIN SODIUM 5000 UNITS: 5000 INJECTION INTRAVENOUS; SUBCUTANEOUS at 02:04

## 2023-04-06 RX ADMIN — FENTANYL CITRATE 25 MCG: 50 INJECTION INTRAMUSCULAR; INTRAVENOUS at 12:04

## 2023-04-06 NOTE — ASSESSMENT & PLAN NOTE
- C diff pending  - Stool studies pending   - Will hold anti-diarrheal medications pending stool study results

## 2023-04-06 NOTE — ASSESSMENT & PLAN NOTE
- Hgb 10.3 on admission  - 11.1 on 2/6 as per care everywhere records  - No signs of acute bleeding. May be secondary to post surgical state.   - Continue to monitor w/ daily CBC and transfuse if Hgb <7

## 2023-04-06 NOTE — CARE UPDATE
Spoke with daughter at bedside. Patient is sensitive to medications and was altered when taking previously at  reheab. She has not been taking any pain meds prior to admission. Volume resuscitated today.

## 2023-04-06 NOTE — PROGRESS NOTES
Pharmacokinetic Initial Assessment: IV Vancomycin    Assessment/Plan:    Initiate intravenous vancomycin with loading dose of 1250 mg once with subsequent doses when random concentrations are less than 20 mcg/mL  Desired empiric serum trough concentration is 15 to 20 mcg/mL    Patient with improving MIKAEL, scr up to 7 on admission but has decreased to 3.9 today. Baseline scr ~1.0    Draw vancomycin random level on 4/7 at 2000.  Pharmacy will continue to follow and monitor vancomycin.      Please contact pharmacy at extension [phone] with any questions regarding this assessment.     Thank you for the consult,   Sailaja Howard       Patient brief summary:  Cristel Zelaya is a 63 y.o. female initiated on antimicrobial therapy with IV Vancomycin for treatment of suspected sepsis    Drug Allergies:   Review of patient's allergies indicates:  No Known Allergies    Actual Body Weight:   68.8 kg    Renal Function:   Estimated Creatinine Clearance: 14.1 mL/min (A) (based on SCr of 3.9 mg/dL (H)).,     Dialysis Method (if applicable):  N/A    CBC (last 72 hours):  Recent Labs   Lab Result Units 04/05/23  1714 04/06/23  0437   WBC K/uL 13.15* 8.81   Hemoglobin g/dL 10.3* 9.2*   Hematocrit % 31.1* 26.5*   Platelets K/uL 383 293   Gran % % 79.5* 84.1*   Lymph % % 6.5* 6.1*   Mono % % 8.5 6.5   Eosinophil % % 0.0 0.1   Basophil % % 0.8 0.2   Differential Method  Automated Automated       Metabolic Panel (last 72 hours):  Recent Labs   Lab Result Units 04/05/23  1819 04/05/23  2057 04/06/23  0437 04/06/23  0438 04/06/23  1454   Sodium mmol/L 123*  --  125* 126* 129*   Sodium, Urine mmol/L  --  <10*  --   --   --    Potassium mmol/L 4.2  --  3.7 3.9 4.7   Chloride mmol/L 90*  --  94* 94* 96   CO2 mmol/L 16*  --  15* 14* 17*   Glucose mg/dL 109  --  106 108 99   Glucose, UA   --  Negative  --   --   --    BUN mg/dL 117*  --  110* 113* 102*   Creatinine mg/dL 7.0*  --  5.1* 5.2* 3.9*   Albumin g/dL 1.9*  --  1.7* 1.8* 1.8*   Total  Bilirubin mg/dL 0.5  --  0.5  --  0.6   Alkaline Phosphatase U/L 99  --  97  --  94   AST U/L 64*  --  54*  --  58*   ALT U/L 39  --  35  --  35   Magnesium mg/dL 2.1  --  2.1  --   --    Phosphorus mg/dL 6.1*  --  5.6* 5.7*  --        Drug levels (last 3 results):  No results for input(s): VANCOMYCINRA, VANCORANDOM, VANCOMYCINPE, VANCOPEAK, VANCOMYCINTR, VANCOTROUGH in the last 72 hours.    Microbiologic Results:  Microbiology Results (last 7 days)       Procedure Component Value Units Date/Time    Blood Culture #1 **CANNOT BE ORDERED STAT** [[id number]] Collected: 04/05/23 1922    Order Status: Completed Specimen: Blood from Peripheral, Forearm, Right Updated: 04/06/23 0345     Blood Culture, Routine No Growth to date    Blood Culture #2 **CANNOT BE ORDERED STAT** [[id number]] Collected: 04/05/23 1922    Order Status: Completed Specimen: Blood from Peripheral, Antecubital, Right Updated: 04/06/23 0345     Blood Culture, Routine No Growth to date    Stool culture [[id number]]     Order Status: No result Specimen: Stool     Influenza A & B by Molecular [[id number]] Collected: 04/05/23 1827    Order Status: Completed Specimen: Nasopharyngeal Swab Updated: 04/05/23 1857     Influenza A, Molecular Negative     Influenza B, Molecular Negative     Flu A & B Source Nasal swab    Clostridium difficile EIA [[id number]]     Order Status: Canceled Specimen: Stool

## 2023-04-06 NOTE — PLAN OF CARE
[facility] DAILY GOALS       A: Awake    RASS: Goal -    Actual -     Restraint necessity:    B: Breathe   SBT: Not intubated   C: Coordinate A & B, analgesics/sedatives   Pain: managed    SAT: Not intubated  D: Delirium   CAM-ICU: Overall CAM-ICU: Negative  E: Early(intubated/ Progressive (non-intubated) Mobility   MOVE Screen: Fail   Activity: Activity Management: Arm raise - L1, Rolling - L1  FAS: Feeding/Nutrition   Diet order: Diet/Nutrition Received: NPO,    T: Thrombus   DVT prophylaxis: VTE Required Core Measure: (SCDs) Sequential compression device initiated/maintained  H: HOB Elevation   Head of Bed (HOB) Positioning: HOB at 30 degrees  U: Ulcer Prophylaxis   GI: yes  G: Glucose control   managed    S: Skin   Bathing/Skin Care: electrode patches/site rotation, bath, partial  Device Skin Pressure Protection: absorbent pad utilized/changed, adhesive use limited  Pressure Reduction Devices: pressure-redistributing mattress utilized  Pressure Reduction Techniques: pressure points protected  Skin Protection: adhesive use limited  B: Bowel Function   no issues   I: Indwelling Catheters   Gong necessity:      Urethral Catheter 04/05/23 2057 Temperature probe 16 Fr.-Reason for Continuing Urinary Catheterization: Critically ill in ICU and requiring hourly monitoring of intake/output   CVC necessity: No  D: De-escalation Antibiotics   Yes    Family/Goals of care/Code Status   Code Status: Full Code    24H Vital Sign Range  Temp:  [98 °F (36.7 °C)-101.8 °F (38.8 °C)]   Pulse:  []   Resp:  [16-47]   BP: ()/(42-88)   SpO2:  [95 %-100 %]      Shift Events   No acute events throughout shift    VS and assessment per flow sheet, patient progressing towards goals as tolerated, plan of care reviewed with family, all concerns addressed, will continue to monitor.    Chasidy Parker

## 2023-04-06 NOTE — ASSESSMENT & PLAN NOTE
- Na 123; likely in the setting of hypovolemia given ongoing diarrhea  - Expect improvement w/ volume resuscitation  - F/u Na studies

## 2023-04-06 NOTE — ASSESSMENT & PLAN NOTE
Baseline Cr 1.0, Cr 7.0 on admission   DDx: Likely pre-renal in the setting of hypovolemia from diarrhea/GI losses. Other ddx include ATN from hypovolemic vs septic shock.   No indications for HD at this time    Plan:  - F/u urine studies   - F/u retroperitoneal U/S to evaluate for obstructive MIKAEL  - Trend Cr/ Serial BMPs  - Strict I&Os, close monitoring of UOP  - Replace electrolytes PRN, goal K/Phos/Mg 4/3/2  - Avoid nephrotoxins (NSAIDs, ACEi/ARB, IV radiocontrast, gadolinium, etc.)  - Renally dose meds

## 2023-04-06 NOTE — PLAN OF CARE
[facility]  Initial Discharge Assessment       Primary Care Provider: Primary Doctor No    Admission Diagnosis: Shock [R57.9]  Generalized abdominal pain [R10.84]  Sepsis, due to unspecified organism, unspecified whether acute organ dysfunction present [A41.9]    Admission Date: 4/5/2023  Expected Discharge Date:     Discharge Barriers Identified: None    Payor: HUMANA MANAGED MEDICARE / Plan: HUMANA MEDICARE PPO / Product Type: Medicare Advantage /     Extended Emergency Contact Information  Primary Emergency Contact: NereidaSade das  Mobile Phone: [phone]  Relation: Daughter  Preferred language: English   needed? No    Discharge Plan A: Home Health, Home with family  Discharge Plan B: Rehab, Home with family    No Pharmacies Listed      Transferred from:     History reviewed. No pertinent past medical history.      CM met with patient and Sade Bunch (daughter) [phone] in room for Discharge Planning Assessment.  Patient was unable to answer questions due to being lethargic and weak.  Per Sade, patient lives in [place], [place]. Sade brought patient to [place] to stay in her home which is a 1-story home  with 0 step(s) to enter.   Per Sade, patient was dependent with ADLS and used rolling walker for ambulation. Per Sade, patient is not on dialysis and does not take Coumadin.   Patient will have help from Sade Bunch (daughter) [phone], Susan Zelaya (daughter) [phone] upon discharge.   Discharge Planning Booklet given to patient/family and discussed.  All questions addressed.  CM will follow for needs.    Calista Stuart is requesting home health if patient discharges home or OSNF/ORehab if recommended.  Initial Assessment (most recent)       Adult Discharge Assessment - 04/06/23 1341          Discharge Assessment    Assessment Type Discharge Planning Assessment     Confirmed/corrected address, phone number and insurance Yes     Confirmed  Demographics Correct on Facesheet     Source of Information family     When was your last doctors appointment? 03/29/23     Communicated DAISY with patient/caregiver Date not available/Unable to determine     Reason For Admission Shock     People in Home child(aisha), adult     Do you expect to return to your current living situation? Yes     Do you have help at home or someone to help you manage your care at home? Yes     Who are your caregiver(s) and their phone number(s)? Sade Bunch (daughter) [phone], Susan Zelyaa (daughter) [phone]     Prior to hospitilization cognitive status: Alert/Oriented     Current cognitive status: Unable to Assess   patient very lethargic and weak    Walking or Climbing Stairs ambulation difficulty, requires equipment     Mobility Management rolling walker     Dressing/Bathing bathing difficulty, assistance 1 person;dressing difficulty, assistance 1 person     Dressing/Bathing Management daughter now assists in these tasks/  patient was in a rehab facility in [facility] and staff assisted at that time.     Equipment Currently Used at Home walker, rolling     Readmission within 30 days? No     Patient currently being followed by outpatient case management? No     Do you currently have service(s) that help you manage your care at home? No     Do you take prescription medications? Yes     Do you have prescription coverage? Yes     Coverage HUMANA MANAGED MEDICARE - HUMANA MEDICARE PPO     Do you have any problems affording any of your prescribed medications? No     Is the patient taking medications as prescribed? yes     Who is going to help you get home at discharge? Sade Bunch (daughter) [phone], Susan Zelaya (daughter) [phone]     How do you get to doctors appointments? family or friend will provide     Are you on dialysis? No     Do you take coumadin? No     Discharge Plan A Home Health;Home with family     Discharge Plan B Rehab;Home with family      DME Needed Upon Discharge  other (see comments)   TBD    Discharge Plan discussed with: Adult children     Discharge Barriers Identified None        Physical Activity    On average, how many days per week do you engage in moderate to strenuous exercise (like a brisk walk)? 0 days     On average, how many minutes do you engage in exercise at this level? 0 min        Financial Resource Strain    How hard is it for you to pay for the very basics like food, housing, medical care, and heating? Not very hard        Housing Stability    In the last 12 months, was there a time when you were not able to pay the mortgage or rent on time? No     In the last 12 months, how many places have you lived? 1     In the last 12 months, was there a time when you did not have a steady place to sleep or slept in a shelter (including now)? No        Transportation Needs    In the past 12 months, has lack of transportation kept you from medical appointments or from getting medications? No     In the past 12 months, has lack of transportation kept you from meetings, work, or from getting things needed for daily living? No        Food Insecurity    Within the past 12 months, you worried that your food would run out before you got the money to buy more. Never true     Within the past 12 months, the food you bought just didn't last and you didn't have money to get more. Never true        Stress    Do you feel stress - tense, restless, nervous, or anxious, or unable to sleep at night because your mind is troubled all the time - these days? Rather much        Social Connections    In a typical week, how many times do you talk on the phone with family, friends, or neighbors? More than three times a week     How often do you get together with friends or relatives? More than three times a week     How often do you attend Advent or Zoroastrianism services? More than 4 times per year     Do you belong to any clubs or organizations such as [employer]  groups, unions, fraternal or athletic groups, or school groups? No     How often do you attend meetings of the clubs or organizations you belong to? Never     Are you , , , , never , or living with a partner?         Alcohol Use    Q1: How often do you have a drink containing alcohol? Never     Q2: How many drinks containing alcohol do you have on a typical day when you are drinking? Patient does not drink     Q3: How often do you have six or more drinks on one occasion? Never        OTHER    Name(s) of People in Home Sade Bunch (daughter) [phone]                            PCP:  Primary Doctor No  None        Pharmacy:  No Pharmacies Listed      Emergency Contacts:  Extended Emergency Contact Information  Primary Emergency Contact: Sade Bunch  Mobile Phone: [phone]  Relation: Daughter  Preferred language: English   needed? No      Insurance:    Payor: HUMANA MANAGED MEDICARE / Plan: HUMANA MEDICARE PPO / Product Type: Medicare Advantage /     Arin Bills RN     [phone]      04/06/2023  2:03 PM

## 2023-04-06 NOTE — HPI
Cristel Zelaya is a 63 year old female with a PMH of HTN, WILMA, CKD, and recent L4-5 posterior decompression and fusion on 3/20 who presented to the ED with abdominal pain, nausea, diarrhea, and weakness. Daughter provided most history on exam. Patient underwent a L4-5 posterior decompression and fusion on 3/20 for chronic LBP TX. She subsequently was hospitalized from 3/20-3/27, followed by inpatient rehab from 3/27-3/31. Per daughter, there were several other patients at the facility who developed diarrhea and had to be admitted.After discharge, she has been staying with her daughter in [place]. She reports that she had had diarrhea over the past few days with 6-7 loose bowel movements/day. No blood reported in her stool. She has also had nausea without vomiting. This morning she developed epigastric abdominal pain which she describes as 8/10, and squeezing. Daughter reports that she has been drinking pedialyte, but over the past day has had decreased po intake, last intake 5:30PM with increased weakness and fatigue. Daughter grew concerned when she became less responsive and seemed more dehydrated with cracked lips at home. No fevers/chills, URI symptoms, urinary symptoms reported.     On arrival, patient with leukocytosis of 13.11, LA increased to 2.4. Blood gas with pH 7.345, pCO2 31.9, pO2 28, HCO3 17.4.  Hyponatremic to 123, hypochloremic to 90, hyperphosphatemia to 6.1. Decreased bicarb of 16. Cr/BUN increased to 7.0/117, baseline Cr ~1.  In the ED, she was given 2 L IV fluid and started on zosyn, metronizole, and po vancomycin given C. Diff concerns given history. Given persistent hypotension 90s/50s despite fluid resuscitation, patient started on levophed. Patient admitted to the MICU.

## 2023-04-06 NOTE — SUBJECTIVE & OBJECTIVE
History reviewed. No pertinent past medical history.    Past Surgical History:   Procedure Laterality Date    BACK SURGERY      CHOLECYSTECTOMY         Review of patient's allergies indicates:  No Known Allergies    Family History    None       Tobacco Use    Smoking status: Never    Smokeless tobacco: Never   Substance and Sexual Activity    Alcohol use: Never    Drug use: Not on file    Sexual activity: Not on file      Review of Systems   Constitutional:  Positive for activity change, appetite change and fatigue. Negative for chills and fever.   HENT:  Negative for congestion, rhinorrhea and sore throat.    Eyes:  Negative for visual disturbance.   Respiratory:  Negative for cough and shortness of breath.    Cardiovascular:  Negative for chest pain and palpitations.   Gastrointestinal:  Positive for abdominal pain, diarrhea and nausea. Negative for blood in stool, constipation and vomiting.   Genitourinary:  Positive for decreased urine volume. Negative for difficulty urinating and dysuria.   Musculoskeletal:  Positive for back pain. Negative for myalgias.   Skin:  Negative for rash.   Neurological:  Positive for weakness. Negative for dizziness, syncope and headaches.   Objective:     Vital Signs (Most Recent):  Temp: 99.1 °F (37.3 °C) (04/05/23 2117)  Pulse: 96 (04/05/23 2117)  Resp: (!) 22 (04/05/23 2117)  BP: (!) 91/50 (04/05/23 2117)  SpO2: 95 % (04/05/23 2117)   Vital Signs (24h Range):  Temp:  [98 °F (36.7 °C)-99.1 °F (37.3 °C)] 99.1 °F (37.3 °C)  Pulse:  [] 96  Resp:  [18-39] 22  SpO2:  [95 %-98 %] 95 %  BP: ()/(44-66) 91/50   Weight: 74.4 kg (164 lb)  Body mass index is 28.15 kg/m².      Intake/Output Summary (Last 24 hours) at 4/5/2023 2137  Last data filed at 4/5/2023 2041  Gross per 24 hour   Intake 1175.36 ml   Output --   Net 1175.36 ml       Physical Exam  Vitals and nursing note reviewed.   HENT:      Head: Normocephalic and atraumatic.      Right Ear: External ear normal.      Left  Ear: External ear normal.      Nose: Nose normal.      Mouth/Throat:      Mouth: Mucous membranes are dry.      Comments: Tongue dry with cracked lips   Cardiovascular:      Rate and Rhythm: Normal rate and regular rhythm.      Pulses: Normal pulses.           Radial pulses are 2+ on the right side and 2+ on the left side.        Dorsalis pedis pulses are 2+ on the right side and 2+ on the left side.      Heart sounds: No murmur heard.    No gallop.   Pulmonary:      Effort: Pulmonary effort is normal. No respiratory distress.      Breath sounds: Normal breath sounds.   Chest:      Chest wall: No tenderness.   Abdominal:      General: Bowel sounds are increased. There is no distension.      Palpations: Abdomen is soft.      Tenderness: There is abdominal tenderness. There is no guarding or rebound.      Comments: Hyperactive bowel sounds. Abdomen diffusely tender to palpation.    Skin:     General: Skin is warm and dry.   Neurological:      Mental Status: She is lethargic.     Vents:     Lines/Drains/Airways       Drain  Duration                  Urethral Catheter 04/05/23 2057 Temperature probe 16 Fr. <1 day              Peripheral Intravenous Line  Duration                  Peripheral IV - Single Lumen 04/05/23 1715 20 G Left Antecubital <1 day         Peripheral IV - Single Lumen 04/05/23 2019 18 G Anterior;Proximal;Right Forearm <1 day                  Significant Labs:    CBC/Anemia Profile:  Recent Labs   Lab 04/05/23  1714   WBC 13.15*   HGB 10.3*   HCT 31.1*      MCV 78*   RDW 14.0        Chemistries:  Recent Labs   Lab 04/05/23  1819   *   K 4.2   CL 90*   CO2 16*   *   CREATININE 7.0*   CALCIUM 8.2*   ALBUMIN 1.9*   PROT 5.8*   BILITOT 0.5   ALKPHOS 99   ALT 39   AST 64*   MG 2.1   PHOS 6.1*       All pertinent labs within the past 24 hours have been reviewed.    Significant Imaging: I have reviewed all pertinent imaging results/findings within the past 24 hours.

## 2023-04-06 NOTE — CONSULTS
Patient seen and evaluated by critical care medicine. To be admitted to ICU for further management. Full H&P to follow.     SADIE Ward, [facility]  Pulmonary Critical Care Medicine   04/05/2023

## 2023-04-06 NOTE — ASSESSMENT & PLAN NOTE
64 yo F w/ HTN, WILMA and recent L4-L5 posterior decompression and fusion on 3/20. Admitted from rehab center w/ abdominal pain, diarrhea and confusion. Reports of diarrheal illness amongst residents at the rehab facility. Hypotensive in the ED. Labs w/ mild leukocytosis, anemia, Na 123, Cl 90, HCO3 16, , Cr 7.0, phos 6.1. Received IVFs in the ED along w/ BSabx. LA 2.4 -->2.2. Admitted to MICU for shock requiring vasopressors.     Ddx include hypovolemic shock in the setting of ongoing diarrhea vs septic shock in the setting of sepsis    Plan:  - Vasopressors as needed to maintain MAP >65  - CT A/P pending  - C diff panel ordered  - Stool studies ordered  - Blood culture x 2 ordered  - IVFs PRN  - Follow up UA  - Will treat empirically for C.diff w/ PO vanc   - Continue Zosyn for intra-abdominal coverage w/ plans to de-escalate pending culture results   - Consult PT/OT when appropriate

## 2023-04-06 NOTE — ASSESSMENT & PLAN NOTE
- Hypochloremic metabolic acidosis w/ mildly elevated AG of 17  - Most likely in the setting of GI losses (severe diarrhea) and hypovolemia w/ presumed pre-renal MIKAEL on CKD  - Expect improvement w/ treatment of MIKAEL  - No HD indications at this time

## 2023-04-06 NOTE — H&P
[facility]  Critical Care Medicine  History & Physical    Patient Name: Cristel Zelaya  MRN: [id number]  Admission Date: 4/5/2023  Hospital Length of Stay: 1 days  Code Status: Full Code  Attending Physician: Bucky Boyle MD   Primary Care Provider: Primary Doctor No   Principal Problem: Shock    Subjective:     HPI:  Cristel Zelaya is a 63 year old female with a PMH of HTN, WILMA, CKD, and recent L4-5 posterior decompression and fusion on 3/20 who presented to the ED with abdominal pain, nausea, diarrhea, and weakness. Daughter provided most history on exam. Patient underwent a L4-5 posterior decompression and fusion on 3/20 for chronic LBP TX. She subsequently was hospitalized from 3/20-3/27, followed by inpatient rehab from 3/27-3/31. Per daughter, there were several other patients at the facility who developed diarrhea and had to be admitted.After discharge, she has been staying with her daughter in [place]. She reports that she had had diarrhea over the past few days with 6-7 loose bowel movements/day. No blood reported in her stool. She has also had nausea without vomiting. This morning she developed epigastric abdominal pain which she describes as 8/10, and squeezing. Daughter reports that she has been drinking pedialyte, but over the past day has had decreased po intake, last intake 5:30PM with increased weakness and fatigue. Daughter grew concerned when she became less responsive and seemed more dehydrated with cracked lips at home. No fevers/chills, URI symptoms, urinary symptoms reported.     On arrival, patient with leukocytosis of 13.11, LA increased to 2.4. Blood gas with pH 7.345, pCO2 31.9, pO2 28, HCO3 17.4.  Hyponatremic to 123, hypochloremic to 90, hyperphosphatemia to 6.1. Decreased bicarb of 16. Cr/BUN increased to 7.0/117, baseline Cr ~1.  In the ED, she was given 2 L IV fluid and started on zosyn, metronizole, and po vancomycin given C. Diff concerns given  history. Given persistent hypotension 90s/50s despite fluid resuscitation, patient started on levophed. Patient admitted to the MICU.         Hospital/ICU Course:  No notes on file     History reviewed. No pertinent past medical history.    Past Surgical History:   Procedure Laterality Date    BACK SURGERY      CHOLECYSTECTOMY         Review of patient's allergies indicates:  No Known Allergies    Family History    None       Tobacco Use    Smoking status: Never    Smokeless tobacco: Never   Substance and Sexual Activity    Alcohol use: Never    Drug use: Not on file    Sexual activity: Not on file      Review of Systems   Constitutional:  Positive for activity change, appetite change and fatigue. Negative for chills and fever.   HENT:  Negative for congestion, rhinorrhea and sore throat.    Eyes:  Negative for visual disturbance.   Respiratory:  Negative for cough and shortness of breath.    Cardiovascular:  Negative for chest pain and palpitations.   Gastrointestinal:  Positive for abdominal pain, diarrhea and nausea. Negative for blood in stool, constipation and vomiting.   Genitourinary:  Positive for decreased urine volume. Negative for difficulty urinating and dysuria.   Musculoskeletal:  Positive for back pain. Negative for myalgias.   Skin:  Negative for rash.   Neurological:  Positive for weakness. Negative for dizziness, syncope and headaches.   Objective:     Vital Signs (Most Recent):  Temp: 99.1 °F (37.3 °C) (04/05/23 2117)  Pulse: 96 (04/05/23 2117)  Resp: (!) 22 (04/05/23 2117)  BP: (!) 91/50 (04/05/23 2117)  SpO2: 95 % (04/05/23 2117)   Vital Signs (24h Range):  Temp:  [98 °F (36.7 °C)-99.1 °F (37.3 °C)] 99.1 °F (37.3 °C)  Pulse:  [] 96  Resp:  [18-39] 22  SpO2:  [95 %-98 %] 95 %  BP: ()/(44-66) 91/50   Weight: 74.4 kg (164 lb)  Body mass index is 28.15 kg/m².      Intake/Output Summary (Last 24 hours) at 4/5/2023 2137  Last data filed at 4/5/2023 2041  Gross per 24 hour   Intake  1175.36 ml   Output --   Net 1175.36 ml       Physical Exam  Vitals and nursing note reviewed.   HENT:      Head: Normocephalic and atraumatic.      Right Ear: External ear normal.      Left Ear: External ear normal.      Nose: Nose normal.      Mouth/Throat:      Mouth: Mucous membranes are dry.      Comments: Tongue dry with cracked lips   Cardiovascular:      Rate and Rhythm: Normal rate and regular rhythm.      Pulses: Normal pulses.           Radial pulses are 2+ on the right side and 2+ on the left side.        Dorsalis pedis pulses are 2+ on the right side and 2+ on the left side.      Heart sounds: No murmur heard.    No gallop.   Pulmonary:      Effort: Pulmonary effort is normal. No respiratory distress.      Breath sounds: Normal breath sounds.   Chest:      Chest wall: No tenderness.   Abdominal:      General: Bowel sounds are increased. There is no distension.      Palpations: Abdomen is soft.      Tenderness: There is abdominal tenderness. There is no guarding or rebound.      Comments: Hyperactive bowel sounds. Abdomen diffusely tender to palpation.    Skin:     General: Skin is warm and dry.   Neurological:      Mental Status: She is lethargic.     Vents:     Lines/Drains/Airways       Drain  Duration                  Urethral Catheter 04/05/23 2057 Temperature probe 16 Fr. <1 day              Peripheral Intravenous Line  Duration                  Peripheral IV - Single Lumen 04/05/23 1715 20 G Left Antecubital <1 day         Peripheral IV - Single Lumen 04/05/23 2019 18 G Anterior;Proximal;Right Forearm <1 day                  Significant Labs:    CBC/Anemia Profile:  Recent Labs   Lab 04/05/23  1714   WBC 13.15*   HGB 10.3*   HCT 31.1*      MCV 78*   RDW 14.0        Chemistries:  Recent Labs   Lab 04/05/23  1819   *   K 4.2   CL 90*   CO2 16*   *   CREATININE 7.0*   CALCIUM 8.2*   ALBUMIN 1.9*   PROT 5.8*   BILITOT 0.5   ALKPHOS 99   ALT 39   AST 64*   MG 2.1   PHOS 6.1*        All pertinent labs within the past 24 hours have been reviewed.    Significant Imaging: I have reviewed all pertinent imaging results/findings within the past 24 hours.    Assessment/Plan:     Neuro  S/P lumbar fusion  S/p L4-5 posterior decompression and fusion on 3/20 and admitted to rehab 3/27-3/31  - stable    Cardiac/Vascular  * Shock  62 yo F w/ HTN, WILMA and recent L4-L5 posterior decompression and fusion on 3/20. Admitted from rehab center w/ abdominal pain, diarrhea and confusion. Reports of diarrheal illness amongst residents at the rehab facility. Hypotensive in the ED. Labs w/ mild leukocytosis, anemia, Na 123, Cl 90, HCO3 16, , Cr 7.0, phos 6.1. Received IVFs in the ED along w/ BSabx. LA 2.4 -->2.2. Admitted to MICU for shock requiring vasopressors.     Ddx include hypovolemic shock in the setting of ongoing diarrhea vs septic shock in the setting of sepsis    Plan:  - Vasopressors as needed to maintain MAP >65  - CT A/P pending  - C diff panel ordered  - Stool studies ordered  - Blood culture x 2 ordered  - IVFs PRN  - Follow up UA  - Will treat empirically for C.diff w/ PO vanc   - Continue Zosyn for intra-abdominal coverage w/ plans to de-escalate pending culture results   - Consult PT/OT when appropriate     Renal/  Hyperphosphatemia  - Phos 6.1 on admission in the setting of MIKAEL  - Expect improvement w/ resolution of MIKAEL  - Will consider initiation of phos binders as needed    Increased anion gap metabolic acidosis  - Hypochloremic metabolic acidosis w/ mildly elevated AG of 17  - Most likely in the setting of GI losses (severe diarrhea) and hypovolemia w/ presumed pre-renal MIKAEL on CKD  - Expect improvement w/ treatment of MIKAEL  - No HD indications at this time    Hyponatremia  - Na 123; likely in the setting of hypovolemia given ongoing diarrhea  - Expect improvement w/ volume resuscitation  - F/u Na studies    MIKAEL (acute kidney injury)  Baseline Cr 1.0, Cr 7.0 on admission   DDx:  Likely pre-renal in the setting of hypovolemia from diarrhea/GI losses. Other ddx include ATN from hypovolemic vs septic shock.   No indications for HD at this time    Plan:  - F/u urine studies   - F/u retroperitoneal U/S to evaluate for obstructive MIKAEL  - Trend Cr/ Serial BMPs  - Strict I&Os, close monitoring of UOP  - Replace electrolytes PRN, goal K/Phos/Mg 4/3/2  - Avoid nephrotoxins (NSAIDs, ACEi/ARB, IV radiocontrast, gadolinium, etc.)  - Renally dose meds        Oncology  Hypoalbuminemia  - consider nutritional consult   - Boost TIDWM    Anemia  - Hgb 10.3 on admission  - 11.1 on 2/6 as per care everywhere records  - No signs of acute bleeding. May be secondary to post surgical state.   - Continue to monitor w/ daily CBC and transfuse if Hgb <7     GI  Generalized abdominal pain  - see shock     Diarrhea  - C diff pending  - Stool studies pending   - Will hold anti-diarrheal medications pending stool study results         Critical Care Daily Checklist:    A: Awake: RASS Goal/Actual Goal:    Actual: Bledsoe Agitation Sedation Scale (RASS): Drowsy   B: Spontaneous Breathing Trial Performed?     C: SAT & SBT Coordinated?  N/A              D: Delirium: CAM-ICU     E: Early Mobility Performed? Yes   F: Feeding Goal:    Status:     Current Diet Order   Procedures    Diet NPO      AS: Analgesia/Sedation None    T: Thromboembolic Prophylaxis None   H: HOB > 300 Yes    U: Stress Ulcer Prophylaxis (if needed) None    G: Glucose Control None   B: Bowel Function      I: Indwelling Catheter (Lines & Pedro) Necessity PIV, pedro   D: De-escalation of Antimicrobials/Pharmacotherapies Antibiotics, as appropriate     Plan for the day/ETD     Code Status:  Family/Goals of Care: Full Code         Critical secondary to Patient has a condition that poses threat to life and bodily function: Shock      Critical care was time spent personally by me on the following activities: development of treatment plan with patient or  surrogate and bedside caregivers, discussions with consultants, evaluation of patient's response to treatment, examination of patient, ordering and performing treatments and interventions, ordering and review of laboratory studies, ordering and review of radiographic studies, pulse oximetry, re-evaluation of patient's condition. This critical care time did not overlap with that of any other provider or involve time for any procedures.     Neetu Howard MD  Critical Care Medicine  [facility] - Cardiac Medical [facility]

## 2023-04-06 NOTE — ASSESSMENT & PLAN NOTE
- Phos 6.1 on admission in the setting of MIKAEL  - Expect improvement w/ resolution of MIKAEL  - Will consider initiation of phos binders as needed

## 2023-04-06 NOTE — PLAN OF CARE
CMICU DAILY GOALS       A: Awake    RASS: Goal -    Actual -     Restraint necessity:    B: Breathe   SBT: Pass   C: Coordinate A & B, analgesics/sedatives   Pain: managed    SAT: Pass  D: Delirium   CAM-ICU:    E: Early(intubated/ Progressive (non-intubated) Mobility   MOVE Screen: Pass   Activity: Activity Management: Rolling - L1  FAS: Feeding/Nutrition   Diet order: Diet/Nutrition Received: NPO,    T: Thrombus   DVT prophylaxis: VTE Required Core Measure: (SCDs) Sequential compression device initiated/maintained  H: HOB Elevation   Head of Bed (HOB) Positioning: HOB at 20 degrees  U: Ulcer Prophylaxis   GI: no  G: Glucose control   managed    S: Skin      Device Skin Pressure Protection: absorbent pad utilized/changed, adhesive use limited, positioning supports utilized  Pressure Reduction Devices: foam padding utilized, specialty bed utilized  Pressure Reduction Techniques: weight shift assistance provided  Skin Protection: adhesive use limited, incontinence pads utilized  B: Bowel Function   No BM this shift    I: Indwelling Catheters   Gong necessity:      Urethral Catheter 04/05/23 2057 Temperature probe 16 Fr.-Reason for Continuing Urinary Catheterization: Critically ill in ICU and requiring hourly monitoring of intake/output   CVC necessity: No  D: De-escalation Antibiotics   Yes    Family/Goals of care/Code Status   Code Status: Full Code    24H Vital Sign Range  Temp:  [98 °F (36.7 °C)-100.8 °F (38.2 °C)]   Pulse:  []   Resp:  [18-43]   BP: ()/(44-88)   SpO2:  [95 %-100 %]      Shift Events   No acute events throughout shift    VS and assessment per flow sheet, patient progressing towards goals as tolerated, plan of care reviewed, all concerns addressed, will continue to monitor.    Estrella Chan

## 2023-04-07 LAB
ALBUMIN SERPL BCP-MCNC: 1.5 G/DL (ref 3.5–5.2)
ALP SERPL-CCNC: 86 U/L (ref 55–135)
ALT SERPL W/O P-5'-P-CCNC: 32 U/L (ref 10–44)
ANION GAP SERPL CALC-SCNC: 15 MMOL/L (ref 8–16)
ANISOCYTOSIS BLD QL SMEAR: SLIGHT
AST SERPL-CCNC: 45 U/L (ref 10–40)
BASOPHILS # BLD AUTO: 0.02 K/UL (ref 0–0.2)
BASOPHILS NFR BLD: 0.3 % (ref 0–1.9)
BILIRUB SERPL-MCNC: 0.6 MG/DL (ref 0.1–1)
BUN SERPL-MCNC: 90 MG/DL (ref 8–23)
BURR CELLS BLD QL SMEAR: ABNORMAL
CALCIUM SERPL-MCNC: 7.9 MG/DL (ref 8.7–10.5)
CHLORIDE SERPL-SCNC: 100 MMOL/L (ref 95–110)
CO2 SERPL-SCNC: 16 MMOL/L (ref 23–29)
CREAT SERPL-MCNC: 3.1 MG/DL (ref 0.5–1.4)
DIFFERENTIAL METHOD: ABNORMAL
EOSINOPHIL # BLD AUTO: 0 K/UL (ref 0–0.5)
EOSINOPHIL NFR BLD: 0 % (ref 0–8)
ERYTHROCYTE [DISTWIDTH] IN BLOOD BY AUTOMATED COUNT: 13.7 % (ref 11.5–14.5)
EST. GFR  (NO RACE VARIABLE): 16.3 ML/MIN/1.73 M^2
GLUCOSE SERPL-MCNC: 103 MG/DL (ref 70–110)
HCT VFR BLD AUTO: 27.7 % (ref 37–48.5)
HGB BLD-MCNC: 9 G/DL (ref 12–16)
HYPOCHROMIA BLD QL SMEAR: ABNORMAL
IMM GRANULOCYTES # BLD AUTO: 0.12 K/UL (ref 0–0.04)
IMM GRANULOCYTES NFR BLD AUTO: 1.7 % (ref 0–0.5)
LYMPHOCYTES # BLD AUTO: 0.6 K/UL (ref 1–4.8)
LYMPHOCYTES NFR BLD: 7.8 % (ref 18–48)
MAGNESIUM SERPL-MCNC: 2.1 MG/DL (ref 1.6–2.6)
MCH RBC QN AUTO: 25.3 PG (ref 27–31)
MCHC RBC AUTO-ENTMCNC: 32.5 G/DL (ref 32–36)
MCV RBC AUTO: 78 FL (ref 82–98)
MONOCYTES # BLD AUTO: 0.4 K/UL (ref 0.3–1)
MONOCYTES NFR BLD: 5.5 % (ref 4–15)
NEUTROPHILS # BLD AUTO: 6 K/UL (ref 1.8–7.7)
NEUTROPHILS NFR BLD: 84.7 % (ref 38–73)
NRBC BLD-RTO: 0 /100 WBC
OB PNL STL: POSITIVE
OVALOCYTES BLD QL SMEAR: ABNORMAL
PHOSPHATE SERPL-MCNC: 4.9 MG/DL (ref 2.7–4.5)
PLATELET # BLD AUTO: 280 K/UL (ref 150–450)
PMV BLD AUTO: 10.7 FL (ref 9.2–12.9)
POIKILOCYTOSIS BLD QL SMEAR: SLIGHT
POLYCHROMASIA BLD QL SMEAR: ABNORMAL
POTASSIUM SERPL-SCNC: 3.5 MMOL/L (ref 3.5–5.1)
PROT SERPL-MCNC: 5.2 G/DL (ref 6–8.4)
RBC # BLD AUTO: 3.56 M/UL (ref 4–5.4)
SCHISTOCYTES BLD QL SMEAR: ABNORMAL
SODIUM SERPL-SCNC: 131 MMOL/L (ref 136–145)
SPHEROCYTES BLD QL SMEAR: ABNORMAL
VANCOMYCIN SERPL-MCNC: 18.1 UG/ML
WBC # BLD AUTO: 7.05 K/UL (ref 3.9–12.7)
WBC #/AREA STL HPF: NORMAL /[HPF]

## 2023-04-07 PROCEDURE — 83735 ASSAY OF MAGNESIUM: CPT | Performed by: NURSE PRACTITIONER

## 2023-04-07 PROCEDURE — 87338 HPYLORI STOOL AG IA: CPT | Performed by: NURSE PRACTITIONER

## 2023-04-07 PROCEDURE — 36415 COLL VENOUS BLD VENIPUNCTURE: CPT | Performed by: INTERNAL MEDICINE

## 2023-04-07 PROCEDURE — 80053 COMPREHEN METABOLIC PANEL: CPT | Performed by: NURSE PRACTITIONER

## 2023-04-07 PROCEDURE — 63600175 PHARM REV CODE 636 W HCPCS: Performed by: INTERNAL MEDICINE

## 2023-04-07 PROCEDURE — 83993 ASSAY FOR CALPROTECTIN FECAL: CPT | Performed by: NURSE PRACTITIONER

## 2023-04-07 PROCEDURE — 25000003 PHARM REV CODE 250: Performed by: NURSE PRACTITIONER

## 2023-04-07 PROCEDURE — 82272 OCCULT BLD FECES 1-3 TESTS: CPT | Performed by: NURSE PRACTITIONER

## 2023-04-07 PROCEDURE — 99291 PR CRITICAL CARE, E/M 30-74 MINUTES: ICD-10-PCS | Mod: ,,, | Performed by: INTERNAL MEDICINE

## 2023-04-07 PROCEDURE — 89055 LEUKOCYTE ASSESSMENT FECAL: CPT | Performed by: NURSE PRACTITIONER

## 2023-04-07 PROCEDURE — 25000003 PHARM REV CODE 250: Performed by: CLINICAL NURSE SPECIALIST

## 2023-04-07 PROCEDURE — 87493 C DIFF AMPLIFIED PROBE: CPT | Performed by: NURSE PRACTITIONER

## 2023-04-07 PROCEDURE — 84100 ASSAY OF PHOSPHORUS: CPT | Performed by: NURSE PRACTITIONER

## 2023-04-07 PROCEDURE — 82705 FATS/LIPIDS FECES QUAL: CPT | Performed by: NURSE PRACTITIONER

## 2023-04-07 PROCEDURE — 25000003 PHARM REV CODE 250: Performed by: INTERNAL MEDICINE

## 2023-04-07 PROCEDURE — 82653 EL-1 FECAL QUANTITATIVE: CPT | Performed by: NURSE PRACTITIONER

## 2023-04-07 PROCEDURE — 63600175 PHARM REV CODE 636 W HCPCS: Performed by: NURSE PRACTITIONER

## 2023-04-07 PROCEDURE — 87425 ROTAVIRUS AG IA: CPT | Performed by: NURSE PRACTITIONER

## 2023-04-07 PROCEDURE — 87329 GIARDIA AG IA: CPT | Performed by: NURSE PRACTITIONER

## 2023-04-07 PROCEDURE — 99291 CRITICAL CARE FIRST HOUR: CPT | Mod: ,,, | Performed by: INTERNAL MEDICINE

## 2023-04-07 PROCEDURE — 87209 SMEAR COMPLEX STAIN: CPT | Performed by: NURSE PRACTITIONER

## 2023-04-07 PROCEDURE — 27000207 HC ISOLATION

## 2023-04-07 PROCEDURE — 80202 ASSAY OF VANCOMYCIN: CPT | Performed by: INTERNAL MEDICINE

## 2023-04-07 PROCEDURE — 20600001 HC STEP DOWN PRIVATE ROOM

## 2023-04-07 PROCEDURE — 85025 COMPLETE CBC W/AUTO DIFF WBC: CPT | Performed by: NURSE PRACTITIONER

## 2023-04-07 PROCEDURE — 87449 NOS EACH ORGANISM AG IA: CPT | Performed by: NURSE PRACTITIONER

## 2023-04-07 RX ORDER — METHOCARBAMOL 500 MG/1
500 TABLET, FILM COATED ORAL ONCE
Status: COMPLETED | OUTPATIENT
Start: 2023-04-07 | End: 2023-04-07

## 2023-04-07 RX ADMIN — SODIUM BICARBONATE 1300 MG: 650 TABLET ORAL at 09:04

## 2023-04-07 RX ADMIN — OXYCODONE HYDROCHLORIDE 5 MG: 5 TABLET ORAL at 12:04

## 2023-04-07 RX ADMIN — LIDOCAINE 1 PATCH: 50 PATCH CUTANEOUS at 09:04

## 2023-04-07 RX ADMIN — METHOCARBAMOL 500 MG: 500 TABLET ORAL at 06:04

## 2023-04-07 RX ADMIN — PIPERACILLIN SODIUM AND TAZOBACTAM SODIUM 4.5 G: 4; .5 INJECTION, POWDER, FOR SOLUTION INTRAVENOUS at 09:04

## 2023-04-07 RX ADMIN — VANCOMYCIN HYDROCHLORIDE 125 MG: KIT at 01:04

## 2023-04-07 RX ADMIN — SODIUM BICARBONATE 1300 MG: 650 TABLET ORAL at 02:04

## 2023-04-07 RX ADMIN — OXYCODONE HYDROCHLORIDE 5 MG: 5 TABLET ORAL at 06:04

## 2023-04-07 RX ADMIN — HEPARIN SODIUM 5000 UNITS: 5000 INJECTION INTRAVENOUS; SUBCUTANEOUS at 09:04

## 2023-04-07 RX ADMIN — HEPARIN SODIUM 5000 UNITS: 5000 INJECTION INTRAVENOUS; SUBCUTANEOUS at 05:04

## 2023-04-07 RX ADMIN — VANCOMYCIN HYDROCHLORIDE 125 MG: KIT at 12:04

## 2023-04-07 RX ADMIN — VANCOMYCIN HYDROCHLORIDE 125 MG: KIT at 05:04

## 2023-04-07 RX ADMIN — OXYCODONE HYDROCHLORIDE 5 MG: 5 TABLET ORAL at 09:04

## 2023-04-07 RX ADMIN — HEPARIN SODIUM 5000 UNITS: 5000 INJECTION INTRAVENOUS; SUBCUTANEOUS at 02:04

## 2023-04-07 RX ADMIN — HYDROMORPHONE HYDROCHLORIDE 0.5 MG: 1 INJECTION, SOLUTION INTRAMUSCULAR; INTRAVENOUS; SUBCUTANEOUS at 05:04

## 2023-04-07 RX ADMIN — VANCOMYCIN HYDROCHLORIDE 125 MG: KIT at 06:04

## 2023-04-07 NOTE — ASSESSMENT & PLAN NOTE
- emperic C diff treatment in setting of colitis and septic shock. Unable to obtain sample.   - Stool studies pending   - may be secondary to viral gastroenteritis in setting of outbreak at rehab facility.

## 2023-04-07 NOTE — ASSESSMENT & PLAN NOTE
Baseline Cr 1.0, Cr 7.0 on admission   DDx: Likely pre-renal in the setting of hypovolemia from diarrhea/GI losses. Other ddx include ATN from hypovolemic vs septic shock.   No indications for HD at this time    Plan:  - F/u urine studies   -no evidence of obstruction on retroperitoneal US.   - Improving BUN/sCr  - Strict I&Os, close monitoring of UOP  - Replace electrolytes PRN, goal K/Phos/Mg 4/3/2  - Avoid nephrotoxins (NSAIDs, ACEi/ARB, IV radiocontrast, gadolinium, etc.)  - Renally dose meds

## 2023-04-07 NOTE — PROGRESS NOTES
[facility]  Critical Care Medicine  Progress Note    Patient Name: Cristel Zelaya  MRN: [id number]  Admission Date: 4/5/2023  Hospital Length of Stay: 2 days  Code Status: Full Code  Attending Provider: Bucky Boyle MD  Primary Care Provider: Primary Doctor No   Principal Problem: Shock    Subjective:     HPI:  Cristel Zelaya is a 63 year old female with a PMH of HTN, WILMA, CKD, and recent L4-5 posterior decompression and fusion on 3/20 who presented to the ED with abdominal pain, nausea, diarrhea, and weakness. Daughter provided most history on exam. Patient underwent a L4-5 posterior decompression and fusion on 3/20 for chronic LBP TX. She subsequently was hospitalized from 3/20-3/27, followed by inpatient rehab from 3/27-3/31. Per daughter, there were several other patients at the facility who developed diarrhea and had to be admitted.After discharge, she has been staying with her daughter in [place]. She reports that she had had diarrhea over the past few days with 6-7 loose bowel movements/day. No blood reported in her stool. She has also had nausea without vomiting. This morning she developed epigastric abdominal pain which she describes as 8/10, and squeezing. Daughter reports that she has been drinking pedialyte, but over the past day has had decreased po intake, last intake 5:30PM with increased weakness and fatigue. Daughter grew concerned when she became less responsive and seemed more dehydrated with cracked lips at home. No fevers/chills, URI symptoms, urinary symptoms reported.     On arrival, patient with leukocytosis of 13.11, LA increased to 2.4. Blood gas with pH 7.345, pCO2 31.9, pO2 28, HCO3 17.4.  Hyponatremic to 123, hypochloremic to 90, hyperphosphatemia to 6.1. Decreased bicarb of 16. Cr/BUN increased to 7.0/117, baseline Cr ~1.  In the ED, she was given 2 L IV fluid and started on zosyn, metronizole, and po vancomycin given C. Diff concerns given history. Given  persistent hypotension 90s/50s despite fluid resuscitation, patient started on levophed. Patient admitted to the MICU.         Hospital/ICU Course:  No notes on file    Interval History/Significant Events: Continued difficult to control back pain at site of previous surgery. Afebrile overnight after starting PO and IV vanc. Off pressors, improving renal function. Decrease in hemoglobin without evidence of bleeding. Neck stiffness and lower extremity weakness overnight.     Review of Systems   Constitutional:  Positive for activity change, chills and fever.   HENT:  Negative for congestion.    Respiratory:  Negative for cough, shortness of breath and wheezing.    Cardiovascular:  Negative for chest pain, palpitations and leg swelling.   Gastrointestinal:  Positive for abdominal pain and diarrhea. Negative for nausea and vomiting.   Genitourinary:  Negative for decreased urine volume and urgency.   Musculoskeletal:  Positive for back pain and neck stiffness.   Allergic/Immunologic: Negative for immunocompromised state.   Neurological:  Negative for dizziness, syncope and light-headedness.   Psychiatric/Behavioral:  Negative for agitation and confusion.    Objective:     Vital Signs (Most Recent):  Temp: 99.5 °F (37.5 °C) (04/07/23 1000)  Pulse: 103 (04/07/23 1000)  Resp: (!) 24 (04/07/23 1000)  BP: (!) 108/59 (04/07/23 1000)  SpO2: 100 % (04/07/23 1000)   Vital Signs (24h Range):  Temp:  [99 °F (37.2 °C)-101.8 °F (38.8 °C)] 99.5 °F (37.5 °C)  Pulse:  [101-110] 103  Resp:  [16-37] 24  SpO2:  [100 %] 100 %  BP: ()/(42-69) 108/59   Weight: 71.2 kg (156 lb 14.4 oz)  Body mass index is 26.93 kg/m².      Intake/Output Summary (Last 24 hours) at 4/7/2023 1008  Last data filed at 4/7/2023 1000  Gross per 24 hour   Intake 2920.98 ml   Output 1570 ml   Net 1350.98 ml       Physical Exam  Vitals and nursing note reviewed.   Constitutional:       Appearance: She is ill-appearing. She is not toxic-appearing or diaphoretic.    Eyes:      General: No scleral icterus.     Extraocular Movements: Extraocular movements intact.      Pupils: Pupils are equal, round, and reactive to light.   Cardiovascular:      Rate and Rhythm: Regular rhythm. Tachycardia present.      Heart sounds: No murmur heard.    No friction rub. No gallop.   Pulmonary:      Effort: Pulmonary effort is normal. No respiratory distress.      Breath sounds: Normal breath sounds. No wheezing, rhonchi or rales.   Abdominal:      General: Bowel sounds are normal. There is distension (mild).      Tenderness: There is abdominal tenderness. There is no guarding or rebound.   Musculoskeletal:         General: No swelling or signs of injury.      Right lower leg: No edema.      Left lower leg: No edema.      Comments: Lumbar midline tenderness to palpation.    Neurological:      General: No focal deficit present.      Mental Status: She is alert and oriented to person, place, and time.      Sensory: No sensory deficit.      Motor: Weakness present.      Coordination: Coordination normal.   Psychiatric:         Mood and Affect: Mood normal.         Behavior: Behavior normal.         Thought Content: Thought content normal.         Judgment: Judgment normal.       Vents:     Lines/Drains/Airways       Drain  Duration                  Urethral Catheter 04/05/23 2057 Temperature probe 16 Fr. 1 day              Peripheral Intravenous Line  Duration                  Peripheral IV - Single Lumen 04/05/23 1715 20 G Left Antecubital 1 day         Peripheral IV - Single Lumen 04/07/23 0503 20 G Anterior;Proximal;Right Forearm <1 day                  Significant Labs:    CBC/Anemia Profile:  Recent Labs   Lab 04/05/23  1714 04/06/23  0437 04/07/23  0140 04/07/23  0450   WBC 13.15* 8.81  --  7.05   HGB 10.3* 9.2*  --  9.0*   HCT 31.1* 26.5*  --  27.7*    293  --  280   MCV 78* 75*  --  78*   RDW 14.0 13.7  --  13.7   OCCULTBLOOD  --   --  Positive*  --         Chemistries:  Recent Labs    Lab 04/05/23  1819 04/06/23  0437 04/06/23  0438 04/06/23  1454 04/07/23  0450   * 125* 126* 129* 131*   K 4.2 3.7 3.9 4.7 3.5   CL 90* 94* 94* 96 100   CO2 16* 15* 14* 17* 16*   * 110* 113* 102* 90*   CREATININE 7.0* 5.1* 5.2* 3.9* 3.1*   CALCIUM 8.2* 8.0* 8.3* 7.8* 7.9*   ALBUMIN 1.9* 1.7* 1.8* 1.8* 1.5*   PROT 5.8* 5.4*  --  5.3* 5.2*   BILITOT 0.5 0.5  --  0.6 0.6   ALKPHOS 99 97  --  94 86   ALT 39 35  --  35 32   AST 64* 54*  --  58* 45*   MG 2.1 2.1  --   --  2.1   PHOS 6.1* 5.6* 5.7*  --  4.9*       Blood Culture:   Recent Labs   Lab 04/05/23  1922   LABBLOO No Growth to date  No Growth to date  No Growth to date  No Growth to date     CMP:   Recent Labs   Lab 04/06/23 0437 04/06/23 0438 04/06/23 1454 04/07/23  0450   * 126* 129* 131*   K 3.7 3.9 4.7 3.5   CL 94* 94* 96 100   CO2 15* 14* 17* 16*    108 99 103   * 113* 102* 90*   CREATININE 5.1* 5.2* 3.9* 3.1*   CALCIUM 8.0* 8.3* 7.8* 7.9*   PROT 5.4*  --  5.3* 5.2*   ALBUMIN 1.7* 1.8* 1.8* 1.5*   BILITOT 0.5  --  0.6 0.6   ALKPHOS 97  --  94 86   AST 54*  --  58* 45*   ALT 35  --  35 32   ANIONGAP 16 18* 16 15     Cardiac Markers: No results for input(s): CKMB, TROPONINT, MYOGLOBIN in the last 48 hours.  Lactic Acid:   Recent Labs   Lab 04/05/23  1714 04/05/23  1922 04/06/23  1454   LACTATE 2.4* 2.2 2.1     Respiratory Culture: No results for input(s): GSRESP, RESPIRATORYC in the last 48 hours.  Troponin: No results for input(s): TROPONINI in the last 48 hours.  Urine Studies:   Recent Labs   Lab 04/05/23 2057   COLORU Yellow   APPEARANCEUA Hazy*   PHUR 5.0   SPECGRAV 1.015   PROTEINUA 1+*   GLUCUA Negative   KETONESU Negative   BILIRUBINUA Negative   OCCULTUA 1+*   NITRITE Negative   LEUKOCYTESUR Negative   RBCUA 1   WBCUA 1   BACTERIA Many*   HYALINECASTS 9*       Significant Imaging:  I have reviewed all pertinent imaging results/findings within the past 24 hours.      ABG  Recent Labs   Lab 04/06/23  1450   PH  7.437   PO2 29*   PCO2 31.5*   HCO3 21.2*   BE -3     Assessment/Plan:     Neuro  S/P lumbar fusion  S/p L4-5 posterior decompression and fusion on 3/20 and admitted to rehab 3/27-3/31  - concern for possible source of infection. Will obtain MRI of lumbar spine. Must be without contrast due to significant MIKAEL.     Cardiac/Vascular  * Shock  62 yo F w/ HTN, WILMA and recent L4-L5 posterior decompression and fusion on 3/20. Admitted from rehab center w/ abdominal pain, diarrhea and confusion. Reports of diarrheal illness amongst residents at the rehab facility. Hypotensive in the ED. Labs w/ mild leukocytosis, anemia, Na 123, Cl 90, HCO3 16, , Cr 7.0, phos 6.1. Received IVFs in the ED along w/ BSabx. LA 2.4 -->2.2. Admitted to MICU for shock requiring vasopressors.     Ddx include hypovolemic shock in the setting of ongoing diarrhea and/or septic shock in the setting of sepsis    Plan:  - Vasopressors as needed to maintain MAP >65; currently off.  - CT A/P with evidence of proctocolitis   - C diff panel ordered  - Stool studies ordered  - Blood culture x 2 pending  - IVFs PRN  - UA with moderate bacteria, negative nitrite.  - continue empiric treatement for C.diff w/ PO vanc   - Continue Zosyn for intra-abdominal coverage, started on IV vanc for continued fevers and history of recent spinal surgery with instrumentation.    - Consult PT/OT when appropriate     Renal/  Hyperphosphatemia  - Phos 6.1 on admission in the setting of MIKAEL  - improving w/ resolution of MIKAEL    Increased anion gap metabolic acidosis  - Most likely in the setting of GI losses (severe diarrhea) and hypovolemia w/ presumed pre-renal MIKAEL on CKD  - started on PO sodium bicarb in setting of diarrheal illness.   - Expect improvement w/ treatment of MIKAEL  - No HD indications at this time    Hyponatremia  - Na 123; likely in the setting of hypovolemia given ongoing diarrhea  - Expect improvement w/ volume resuscitation  - F/u Na studies    MIKAEL  (acute kidney injury)  Baseline Cr 1.0, Cr 7.0 on admission   DDx: Likely pre-renal in the setting of hypovolemia from diarrhea/GI losses. Other ddx include ATN from hypovolemic vs septic shock.   No indications for HD at this time    Plan:  - F/u urine studies   -no evidence of obstruction on retroperitoneal US.   - Improving BUN/sCr  - Strict I&Os, close monitoring of UOP  - Replace electrolytes PRN, goal K/Phos/Mg 4/3/2  - Avoid nephrotoxins (NSAIDs, ACEi/ARB, IV radiocontrast, gadolinium, etc.)  - Renally dose meds        Oncology  Hypoalbuminemia  - consider nutritional consult   - Boost TIDWM    Anemia  - Hgb 10.3 on admission  - 11.1 on 2/6 as per care everywhere records  - No signs of acute bleeding. May be secondary to post surgical state.   - Continue to monitor w/ daily CBC and transfuse if Hgb <7     GI  Generalized abdominal pain  - likely secondary to proctocolitis. Will continue to monitor with abdominal exam. No evidence of surgical process on CT A/P.     Diarrhea  - emperic C diff treatment in setting of colitis and septic shock. Unable to obtain sample.   - Stool studies pending   - may be secondary to viral gastroenteritis in setting of outbreak at rehab facility.         Critical Care Daily Checklist:    A: Awake: RASS Goal/Actual Goal: RASS Goal: 0-->alert and calm  Actual: Bledsoe Agitation Sedation Scale (RASS): Drowsy   B: Spontaneous Breathing Trial Performed?  NA   C: SAT & SBT Coordinated?  NA                     D: Delirium: CAM-ICU Overall CAM-ICU: Negative   E: Early Mobility Performed? No   F: Feeding Goal:    Status:     Current Diet Order   Procedures    Diet NPO      AS: Analgesia/Sedation Oxycodone and PRN dilaudid   T: Thromboembolic Prophylaxis [id number]   H: HOB > 300 Yes   U: Stress Ulcer Prophylaxis (if needed) NA   G: Glucose Control yes   B: Bowel Function Stool Occurrence: 0   I: Indwelling Catheter (Lines & Gong) Necessity Gong  Severe MIKAEL, strict I/O   D: De-escalation  of Antimicrobials/Pharmacotherapies Not at this point.     Plan for the day/ETD MRI of back, continue abx, pain control and step down    Code Status:  Family/Goals of Care: Full Code  Continue aggressive care     Critical Care Time: 50 minutes  Critical secondary to Patient has a condition that poses threat to life and bodily function: Acute Renal Failure and septic shock resolved. Plan for step down.       Critical care was time spent personally by me on the following activities: development of treatment plan with patient or surrogate and bedside caregivers, discussions with consultants, evaluation of patient's response to treatment, examination of patient, ordering and performing treatments and interventions, ordering and review of laboratory studies, ordering and review of radiographic studies, pulse oximetry, re-evaluation of patient's condition. This critical care time did not overlap with that of any other provider or involve time for any procedures.     Bucky Boyle MD  Critical Care Medicine  [facility] - Cardiac Medical ICU

## 2023-04-07 NOTE — PLAN OF CARE
CMICU DAILY GOALS       A: Awake    RASS: Goal - RASS Goal: 0-->alert and calm  Actual - RASS (Bledsoe Agitation-Sedation Scale): drowsy   Restraint necessity:    B: Breathe   SBT: Not intubated   C: Coordinate A & B, analgesics/sedatives   Pain: managed    SAT: Not intubated  D: Delirium   CAM-ICU: Overall CAM-ICU: Negative  E: Early(intubated/ Progressive (non-intubated) Mobility   MOVE Screen: Fail   Activity: Activity Management: Rolling - L1  FAS: Feeding/Nutrition   Diet order: Diet/Nutrition Received: NPO,    T: Thrombus   DVT prophylaxis: VTE Required Core Measure: Pharmacological prophylaxis initiated/maintained, (SCDs) Sequential compression device initiated/maintained  H: HOB Elevation   Head of Bed (HOB) Positioning: HOB at 30 degrees  U: Ulcer Prophylaxis   GI: yes  G: Glucose control   managed    S: Skin   Bathing/Skin Care: incontinence care  Device Skin Pressure Protection: absorbent pad utilized/changed, adhesive use limited, positioning supports utilized, pressure points protected, skin-to-device areas padded, skin-to-skin areas padded  Pressure Reduction Devices: elbow protectors utilized, foam padding utilized, heel offloading device utilized, positioning supports utilized, pressure-redistributing mattress utilized  Pressure Reduction Techniques: frequent weight shift encouraged, heels elevated off bed, pressure points protected, weight shift assistance provided  Skin Protection: adhesive use limited, incontinence pads utilized, protective footwear used, silicone foam dressing in place, skin-to-device areas padded, skin-to-skin areas padded, transparent dressing maintained, tubing/devices free from skin contact  B: Bowel Function   diarrhea   I: Indwelling Catheters   Gong necessity:      Urethral Catheter 04/05/23 2057 Temperature probe 16 Fr.-Reason for Continuing Urinary Catheterization: Critically ill in ICU and requiring hourly monitoring of intake/output   CVC necessity: No  D:  De-escalation Antibiotics   Yes    Family/Goals of care/Code Status   Code Status: Full Code    24H Vital Sign Range  Temp:  [99 °F (37.2 °C)-101.8 °F (38.8 °C)]   Pulse:  [101-110]   Resp:  [16-37]   BP: ()/(51-69)   SpO2:  [98 %-100 %]      Shift Events   No acute events throughout shift    VS and assessment per flow sheet, patient progressing towards goals as tolerated, plan of care reviewed with  Cristel Zelaya and family , all concerns addressed, will continue to monitor.    Simran Cardenas

## 2023-04-07 NOTE — PLAN OF CARE
CMICU DAILY GOALS       A: Awake    RASS: Goal - RASS Goal: 0-->alert and calm  Actual - RASS (Bledsoe Agitation-Sedation Scale): restless   Restraint necessity:    B: Breathe   SBT: Not intubated   C: Coordinate A & B, analgesics/sedatives   Pain: managed    SAT: Not intubated  D: Delirium   CAM-ICU: Overall CAM-ICU: Negative  E: Early(intubated/ Progressive (non-intubated) Mobility   MOVE Screen: Pass   Activity: Activity Management: Rolling - L1  FAS: Feeding/Nutrition   Diet order: Diet/Nutrition Received: NPO,    T: Thrombus   DVT prophylaxis: VTE Required Core Measure: (SCDs) Sequential compression device initiated/maintained, Pharmacological prophylaxis initiated/maintained  H: HOB Elevation   Head of Bed (HOB) Positioning: HOB at 30 degrees  U: Ulcer Prophylaxis   GI: yes  G: Glucose control   managed    S: Skin   Bathing/Skin Care: electrode patches/site rotation, bath, partial  Device Skin Pressure Protection: adhesive use limited  Pressure Reduction Devices: foam padding utilized, positioning supports utilized, pressure-redistributing mattress utilized, specialty bed utilized  Pressure Reduction Techniques: frequent weight shift encouraged  Skin Protection: adhesive use limited, incontinence pads utilized, transparent dressing maintained, tubing/devices free from skin contact  B: Bowel Function   diarrhea   I: Indwelling Catheters   Gong necessity:      Urethral Catheter 04/05/23 2057 Temperature probe 16 Fr.-Reason for Continuing Urinary Catheterization: Critically ill in ICU and requiring hourly monitoring of intake/output   CVC necessity: No  D: De-escalation Antibiotics   Yes    Family/Goals of care/Code Status   Code Status: Full Code    24H Vital Sign Range  Temp:  [99.1 °F (37.3 °C)-101.8 °F (38.8 °C)]   Pulse:  [100-110]   Resp:  [16-37]   BP: ()/(42-69)   SpO2:  [100 %]      Shift Events   Stool specimens sent overnight. Increased neck stiffness and lower back pain. Given robaxin this  morning. Daughter remained at the bedside overnight.         Rainer Alejo, SUMITN, RN, CCRN

## 2023-04-07 NOTE — ASSESSMENT & PLAN NOTE
62 yo F w/ HTN, WILMA and recent L4-L5 posterior decompression and fusion on 3/20. Admitted from rehab center w/ abdominal pain, diarrhea and confusion. Reports of diarrheal illness amongst residents at the rehab facility. Hypotensive in the ED. Labs w/ mild leukocytosis, anemia, Na 123, Cl 90, HCO3 16, , Cr 7.0, phos 6.1. Received IVFs in the ED along w/ BSabx. LA 2.4 -->2.2. Admitted to MICU for shock requiring vasopressors.     Ddx include hypovolemic shock in the setting of ongoing diarrhea and/or septic shock in the setting of sepsis    Plan:  - Vasopressors as needed to maintain MAP >65; currently off.  - CT A/P with evidence of proctocolitis   - C diff panel ordered  - Stool studies ordered  - Blood culture x 2 pending  - IVFs PRN  - UA with moderate bacteria, negative nitrite.  - continue empiric treatement for C.diff w/ PO vanc   - Continue Zosyn for intra-abdominal coverage, started on IV vanc for continued fevers and history of recent spinal surgery with instrumentation.    - Consult PT/OT when appropriate

## 2023-04-07 NOTE — SUBJECTIVE & OBJECTIVE
Interval History/Significant Events: Continued difficult to control back pain at site of previous surgery. Afebrile overnight after starting PO and IV vanc. Off pressors, improving renal function. Decrease in hemoglobin without evidence of bleeding. Neck stiffness and lower extremity weakness overnight.     Review of Systems   Constitutional:  Positive for activity change, chills and fever.   HENT:  Negative for congestion.    Respiratory:  Negative for cough, shortness of breath and wheezing.    Cardiovascular:  Negative for chest pain, palpitations and leg swelling.   Gastrointestinal:  Positive for abdominal pain and diarrhea. Negative for nausea and vomiting.   Genitourinary:  Negative for decreased urine volume and urgency.   Musculoskeletal:  Positive for back pain and neck stiffness.   Allergic/Immunologic: Negative for immunocompromised state.   Neurological:  Negative for dizziness, syncope and light-headedness.   Psychiatric/Behavioral:  Negative for agitation and confusion.    Objective:     Vital Signs (Most Recent):  Temp: 99.5 °F (37.5 °C) (04/07/23 1000)  Pulse: 103 (04/07/23 1000)  Resp: (!) 24 (04/07/23 1000)  BP: (!) 108/59 (04/07/23 1000)  SpO2: 100 % (04/07/23 1000)   Vital Signs (24h Range):  Temp:  [99 °F (37.2 °C)-101.8 °F (38.8 °C)] 99.5 °F (37.5 °C)  Pulse:  [101-110] 103  Resp:  [16-37] 24  SpO2:  [100 %] 100 %  BP: ()/(42-69) 108/59   Weight: 71.2 kg (156 lb 14.4 oz)  Body mass index is 26.93 kg/m².      Intake/Output Summary (Last 24 hours) at 4/7/2023 1008  Last data filed at 4/7/2023 1000  Gross per 24 hour   Intake 2920.98 ml   Output 1570 ml   Net 1350.98 ml       Physical Exam  Vitals and nursing note reviewed.   Constitutional:       Appearance: She is ill-appearing. She is not toxic-appearing or diaphoretic.   Eyes:      General: No scleral icterus.     Extraocular Movements: Extraocular movements intact.      Pupils: Pupils are equal, round, and reactive to light.    Cardiovascular:      Rate and Rhythm: Regular rhythm. Tachycardia present.      Heart sounds: No murmur heard.    No friction rub. No gallop.   Pulmonary:      Effort: Pulmonary effort is normal. No respiratory distress.      Breath sounds: Normal breath sounds. No wheezing, rhonchi or rales.   Abdominal:      General: Bowel sounds are normal. There is distension (mild).      Tenderness: There is abdominal tenderness. There is no guarding or rebound.   Musculoskeletal:         General: No swelling or signs of injury.      Right lower leg: No edema.      Left lower leg: No edema.      Comments: Lumbar midline tenderness to palpation.    Neurological:      General: No focal deficit present.      Mental Status: She is alert and oriented to person, place, and time.      Sensory: No sensory deficit.      Motor: Weakness present.      Coordination: Coordination normal.   Psychiatric:         Mood and Affect: Mood normal.         Behavior: Behavior normal.         Thought Content: Thought content normal.         Judgment: Judgment normal.       Vents:     Lines/Drains/Airways       Drain  Duration                  Urethral Catheter 04/05/23 2057 Temperature probe 16 Fr. 1 day              Peripheral Intravenous Line  Duration                  Peripheral IV - Single Lumen 04/05/23 1715 20 G Left Antecubital 1 day         Peripheral IV - Single Lumen 04/07/23 0503 20 G Anterior;Proximal;Right Forearm <1 day                  Significant Labs:    CBC/Anemia Profile:  Recent Labs   Lab 04/05/23  1714 04/06/23  0437 04/07/23  0140 04/07/23  0450   WBC 13.15* 8.81  --  7.05   HGB 10.3* 9.2*  --  9.0*   HCT 31.1* 26.5*  --  27.7*    293  --  280   MCV 78* 75*  --  78*   RDW 14.0 13.7  --  13.7   OCCULTBLOOD  --   --  Positive*  --         Chemistries:  Recent Labs   Lab 04/05/23  1819 04/06/23  0437 04/06/23  0438 04/06/23  1454 04/07/23  0450   * 125* 126* 129* 131*   K 4.2 3.7 3.9 4.7 3.5   CL 90* 94* 94* 96 100    CO2 16* 15* 14* 17* 16*   * 110* 113* 102* 90*   CREATININE 7.0* 5.1* 5.2* 3.9* 3.1*   CALCIUM 8.2* 8.0* 8.3* 7.8* 7.9*   ALBUMIN 1.9* 1.7* 1.8* 1.8* 1.5*   PROT 5.8* 5.4*  --  5.3* 5.2*   BILITOT 0.5 0.5  --  0.6 0.6   ALKPHOS 99 97  --  94 86   ALT 39 35  --  35 32   AST 64* 54*  --  58* 45*   MG 2.1 2.1  --   --  2.1   PHOS 6.1* 5.6* 5.7*  --  4.9*       Blood Culture:   Recent Labs   Lab 04/05/23 1922   LABBLOO No Growth to date  No Growth to date  No Growth to date  No Growth to date     CMP:   Recent Labs   Lab 04/06/23  0437 04/06/23  0438 04/06/23  1454 04/07/23  0450   * 126* 129* 131*   K 3.7 3.9 4.7 3.5   CL 94* 94* 96 100   CO2 15* 14* 17* 16*    108 99 103   * 113* 102* 90*   CREATININE 5.1* 5.2* 3.9* 3.1*   CALCIUM 8.0* 8.3* 7.8* 7.9*   PROT 5.4*  --  5.3* 5.2*   ALBUMIN 1.7* 1.8* 1.8* 1.5*   BILITOT 0.5  --  0.6 0.6   ALKPHOS 97  --  94 86   AST 54*  --  58* 45*   ALT 35  --  35 32   ANIONGAP 16 18* 16 15     Cardiac Markers: No results for input(s): CKMB, TROPONINT, MYOGLOBIN in the last 48 hours.  Lactic Acid:   Recent Labs   Lab 04/05/23  1714 04/05/23 1922 04/06/23  1454   LACTATE 2.4* 2.2 2.1     Respiratory Culture: No results for input(s): GSRESP, RESPIRATORYC in the last 48 hours.  Troponin: No results for input(s): TROPONINI in the last 48 hours.  Urine Studies:   Recent Labs   Lab 04/05/23 2057   COLORU Yellow   APPEARANCEUA Hazy*   PHUR 5.0   SPECGRAV 1.015   PROTEINUA 1+*   GLUCUA Negative   KETONESU Negative   BILIRUBINUA Negative   OCCULTUA 1+*   NITRITE Negative   LEUKOCYTESUR Negative   RBCUA 1   WBCUA 1   BACTERIA Many*   HYALINECASTS 9*       Significant Imaging:  I have reviewed all pertinent imaging results/findings within the past 24 hours.

## 2023-04-07 NOTE — ASSESSMENT & PLAN NOTE
- likely secondary to proctocolitis. Will continue to monitor with abdominal exam. No evidence of surgical process on CT A/P.

## 2023-04-07 NOTE — ASSESSMENT & PLAN NOTE
- Most likely in the setting of GI losses (severe diarrhea) and hypovolemia w/ presumed pre-renal MIKAEL on CKD  - started on PO sodium bicarb in setting of diarrheal illness.   - Expect improvement w/ treatment of MIKAEL  - No HD indications at this time   No pertinent past medical history

## 2023-04-07 NOTE — RESIDENT HANDOFF
ICU Transfer of Care Note  Critical Care Medicine    Admit Date: 4/5/2023  LOS: 2    CC: Shock    Code Status: Full Code         HPI and Hospital Course:     HPI:  Cristel Zelaya is a 63 year old female with a PMH of HTN, WILMA, CKD, and recent L4-5 posterior decompression and fusion on 3/20 who presented to the ED with abdominal pain, nausea, diarrhea, and weakness. Daughter provided most history on exam. Patient underwent a L4-5 posterior decompression and fusion on 3/20 for chronic LBP TX. She subsequently was hospitalized from 3/20-3/27, followed by inpatient rehab from 3/27-3/31. Per daughter, there were several other patients at the facility who developed diarrhea and had to be admitted.After discharge, she has been staying with her daughter in [place]. She reports that she had had diarrhea over the past few days with 6-7 loose bowel movements/day. No blood reported in her stool. She has also had nausea without vomiting. This morning she developed epigastric abdominal pain which she describes as 8/10, and squeezing. Daughter reports that she has been drinking pedialyte, but over the past day has had decreased po intake, last intake 5:30PM with increased weakness and fatigue. Daughter grew concerned when she became less responsive and seemed more dehydrated with cracked lips at home. No fevers/chills, URI symptoms, urinary symptoms reported.     On arrival, patient with leukocytosis of 13.11, LA increased to 2.4. Blood gas with pH 7.345, pCO2 31.9, pO2 28, HCO3 17.4.  Hyponatremic to 123, hypochloremic to 90, hyperphosphatemia to 6.1. Decreased bicarb of 16. Cr/BUN increased to 7.0/117, baseline Cr ~1.  In the ED, she was given 2 L IV fluid and started on zosyn, metronizole, and po vancomycin given C. Diff concerns given history. Given persistent hypotension 90s/50s despite fluid resuscitation, patient started on levophed. Patient admitted to the MICU.       Hospital/ICU Course:  No notes on file      To  Follow Up:     -- Follow up MRI  -- Follow up culture data  -- Trend CBC  -- Follow renal function      Discharge Plan:     TBD.      Call [phone] with questions.     Marva DAVIS  Pulmonary Critical Care Medicine  04/07/2023  10:34 AM

## 2023-04-07 NOTE — ASSESSMENT & PLAN NOTE
S/p L4-5 posterior decompression and fusion on 3/20 and admitted to rehab 3/27-3/31  - concern for possible source of infection. Will obtain MRI of lumbar spine. Must be without contrast due to significant MIKAEL.

## 2023-04-07 NOTE — NURSING
CC NP Flavia notified of new onset of neck stiffness with associated worsening lower back pain and increase BLE weakness. Orders to be placed for robaxin. Pt remains AOx4, sensations intact.

## 2023-04-08 PROBLEM — A04.72 C. DIFFICILE COLITIS: Status: ACTIVE | Noted: 2023-04-05

## 2023-04-08 PROBLEM — E87.6 HYPOKALEMIA: Status: ACTIVE | Noted: 2023-04-08

## 2023-04-08 LAB
ACANTHOCYTES BLD QL SMEAR: PRESENT
ALBUMIN SERPL BCP-MCNC: 1.6 G/DL (ref 3.5–5.2)
ALP SERPL-CCNC: 115 U/L (ref 55–135)
ALT SERPL W/O P-5'-P-CCNC: 29 U/L (ref 10–44)
ANION GAP SERPL CALC-SCNC: 11 MMOL/L (ref 8–16)
ANISOCYTOSIS BLD QL SMEAR: SLIGHT
AST SERPL-CCNC: 43 U/L (ref 10–40)
BASO STIPL BLD QL SMEAR: ABNORMAL
BASOPHILS # BLD AUTO: ABNORMAL K/UL (ref 0–0.2)
BASOPHILS NFR BLD: 0 % (ref 0–1.9)
BILIRUB SERPL-MCNC: 0.5 MG/DL (ref 0.1–1)
BUN SERPL-MCNC: 78 MG/DL (ref 8–23)
BURR CELLS BLD QL SMEAR: ABNORMAL
C DIFF GDH STL QL: POSITIVE
C DIFF TOX A+B STL QL IA: NEGATIVE
C DIFF TOX GENS STL QL NAA+PROBE: POSITIVE
CALCIUM SERPL-MCNC: 8 MG/DL (ref 8.7–10.5)
CHLORIDE SERPL-SCNC: 103 MMOL/L (ref 95–110)
CO2 SERPL-SCNC: 21 MMOL/L (ref 23–29)
CREAT SERPL-MCNC: 2.1 MG/DL (ref 0.5–1.4)
CRYPTOSP AG STL QL IA: NEGATIVE
DACRYOCYTES BLD QL SMEAR: ABNORMAL
DIFFERENTIAL METHOD: ABNORMAL
DOHLE BOD BLD QL SMEAR: PRESENT
EOSINOPHIL # BLD AUTO: ABNORMAL K/UL (ref 0–0.5)
EOSINOPHIL NFR BLD: 1 % (ref 0–8)
ERYTHROCYTE [DISTWIDTH] IN BLOOD BY AUTOMATED COUNT: 13.9 % (ref 11.5–14.5)
EST. GFR  (NO RACE VARIABLE): 26 ML/MIN/1.73 M^2
G LAMBLIA AG STL QL IA: NEGATIVE
GIANT PLATELETS BLD QL SMEAR: PRESENT
GLUCOSE SERPL-MCNC: 112 MG/DL (ref 70–110)
HCT VFR BLD AUTO: 28.6 % (ref 37–48.5)
HGB BLD-MCNC: 9.3 G/DL (ref 12–16)
HYPOCHROMIA BLD QL SMEAR: ABNORMAL
IMM GRANULOCYTES # BLD AUTO: ABNORMAL K/UL (ref 0–0.04)
IMM GRANULOCYTES NFR BLD AUTO: ABNORMAL % (ref 0–0.5)
LYMPHOCYTES # BLD AUTO: ABNORMAL K/UL (ref 1–4.8)
LYMPHOCYTES NFR BLD: 13 % (ref 18–48)
MAGNESIUM SERPL-MCNC: 2.2 MG/DL (ref 1.6–2.6)
MCH RBC QN AUTO: 25.5 PG (ref 27–31)
MCHC RBC AUTO-ENTMCNC: 32.5 G/DL (ref 32–36)
MCV RBC AUTO: 79 FL (ref 82–98)
METAMYELOCYTES NFR BLD MANUAL: 1 %
MONOCYTES # BLD AUTO: ABNORMAL K/UL (ref 0.3–1)
MONOCYTES NFR BLD: 4 % (ref 4–15)
NEUTROPHILS NFR BLD: 73 % (ref 38–73)
NEUTS BAND NFR BLD MANUAL: 8 %
NRBC BLD-RTO: 0 /100 WBC
OVALOCYTES BLD QL SMEAR: ABNORMAL
PHOSPHATE SERPL-MCNC: 3.6 MG/DL (ref 2.7–4.5)
PLATELET # BLD AUTO: 293 K/UL (ref 150–450)
PLATELET BLD QL SMEAR: ABNORMAL
PMV BLD AUTO: 10.9 FL (ref 9.2–12.9)
POIKILOCYTOSIS BLD QL SMEAR: ABNORMAL
POLYCHROMASIA BLD QL SMEAR: ABNORMAL
POTASSIUM SERPL-SCNC: 3.3 MMOL/L (ref 3.5–5.1)
PROT SERPL-MCNC: 5.3 G/DL (ref 6–8.4)
RBC # BLD AUTO: 3.64 M/UL (ref 4–5.4)
RV AG STL QL IA.RAPID: NEGATIVE
SCHISTOCYTES BLD QL SMEAR: ABNORMAL
SCHISTOCYTES BLD QL SMEAR: PRESENT
SODIUM SERPL-SCNC: 135 MMOL/L (ref 136–145)
SPHEROCYTES BLD QL SMEAR: ABNORMAL
TARGETS BLD QL SMEAR: ABNORMAL
WBC # BLD AUTO: 6.33 K/UL (ref 3.9–12.7)

## 2023-04-08 PROCEDURE — 80053 COMPREHEN METABOLIC PANEL: CPT | Performed by: NURSE PRACTITIONER

## 2023-04-08 PROCEDURE — 84100 ASSAY OF PHOSPHORUS: CPT | Performed by: NURSE PRACTITIONER

## 2023-04-08 PROCEDURE — 25000003 PHARM REV CODE 250: Performed by: INTERNAL MEDICINE

## 2023-04-08 PROCEDURE — 63600175 PHARM REV CODE 636 W HCPCS: Performed by: NURSE PRACTITIONER

## 2023-04-08 PROCEDURE — 27000207 HC ISOLATION

## 2023-04-08 PROCEDURE — 83735 ASSAY OF MAGNESIUM: CPT | Performed by: NURSE PRACTITIONER

## 2023-04-08 PROCEDURE — 63600175 PHARM REV CODE 636 W HCPCS: Performed by: INTERNAL MEDICINE

## 2023-04-08 PROCEDURE — 85027 COMPLETE CBC AUTOMATED: CPT | Performed by: NURSE PRACTITIONER

## 2023-04-08 PROCEDURE — 36415 COLL VENOUS BLD VENIPUNCTURE: CPT | Performed by: NURSE PRACTITIONER

## 2023-04-08 PROCEDURE — 20600001 HC STEP DOWN PRIVATE ROOM

## 2023-04-08 PROCEDURE — 99233 PR SUBSEQUENT HOSPITAL CARE,LEVL III: ICD-10-PCS | Mod: ,,, | Performed by: PHYSICIAN ASSISTANT

## 2023-04-08 PROCEDURE — 25000003 PHARM REV CODE 250: Performed by: HOSPITALIST

## 2023-04-08 PROCEDURE — 85007 BL SMEAR W/DIFF WBC COUNT: CPT | Performed by: NURSE PRACTITIONER

## 2023-04-08 PROCEDURE — 25000003 PHARM REV CODE 250: Performed by: PHYSICIAN ASSISTANT

## 2023-04-08 PROCEDURE — 25000003 PHARM REV CODE 250: Performed by: CLINICAL NURSE SPECIALIST

## 2023-04-08 PROCEDURE — 25000003 PHARM REV CODE 250: Performed by: NURSE PRACTITIONER

## 2023-04-08 PROCEDURE — 99233 SBSQ HOSP IP/OBS HIGH 50: CPT | Mod: ,,, | Performed by: PHYSICIAN ASSISTANT

## 2023-04-08 RX ADMIN — VANCOMYCIN HYDROCHLORIDE 125 MG: KIT at 03:04

## 2023-04-08 RX ADMIN — ONDANSETRON 4 MG: 2 INJECTION INTRAMUSCULAR; INTRAVENOUS at 08:04

## 2023-04-08 RX ADMIN — SODIUM BICARBONATE 1300 MG: 650 TABLET ORAL at 02:04

## 2023-04-08 RX ADMIN — HEPARIN SODIUM 5000 UNITS: 5000 INJECTION INTRAVENOUS; SUBCUTANEOUS at 06:04

## 2023-04-08 RX ADMIN — SODIUM CHLORIDE, POTASSIUM CHLORIDE, SODIUM LACTATE AND CALCIUM CHLORIDE: 600; 310; 30; 20 INJECTION, SOLUTION INTRAVENOUS at 02:04

## 2023-04-08 RX ADMIN — HYDROMORPHONE HYDROCHLORIDE 0.5 MG: 1 INJECTION, SOLUTION INTRAMUSCULAR; INTRAVENOUS; SUBCUTANEOUS at 09:04

## 2023-04-08 RX ADMIN — OXYCODONE HYDROCHLORIDE 5 MG: 5 TABLET ORAL at 08:04

## 2023-04-08 RX ADMIN — HEPARIN SODIUM 5000 UNITS: 5000 INJECTION INTRAVENOUS; SUBCUTANEOUS at 09:04

## 2023-04-08 RX ADMIN — VANCOMYCIN HYDROCHLORIDE 125 MG: KIT at 08:04

## 2023-04-08 RX ADMIN — PIPERACILLIN SODIUM AND TAZOBACTAM SODIUM 4.5 G: 4; .5 INJECTION, POWDER, FOR SOLUTION INTRAVENOUS at 08:04

## 2023-04-08 RX ADMIN — OXYCODONE HYDROCHLORIDE 5 MG: 5 TABLET ORAL at 05:04

## 2023-04-08 RX ADMIN — VANCOMYCIN HYDROCHLORIDE 125 MG: KIT at 02:04

## 2023-04-08 RX ADMIN — VANCOMYCIN HYDROCHLORIDE 125 MG: KIT at 09:04

## 2023-04-08 RX ADMIN — HYDROMORPHONE HYDROCHLORIDE 0.5 MG: 1 INJECTION, SOLUTION INTRAMUSCULAR; INTRAVENOUS; SUBCUTANEOUS at 02:04

## 2023-04-08 RX ADMIN — Medication 1 CAPSULE: at 09:04

## 2023-04-08 RX ADMIN — POTASSIUM BICARBONATE 40 MEQ: 391 TABLET, EFFERVESCENT ORAL at 02:04

## 2023-04-08 RX ADMIN — SODIUM BICARBONATE 1300 MG: 650 TABLET ORAL at 08:04

## 2023-04-08 RX ADMIN — VANCOMYCIN HYDROCHLORIDE 1000 MG: 1 INJECTION, POWDER, LYOPHILIZED, FOR SOLUTION INTRAVENOUS at 09:04

## 2023-04-08 RX ADMIN — LIDOCAINE 1 PATCH: 50 PATCH CUTANEOUS at 08:04

## 2023-04-08 RX ADMIN — HEPARIN SODIUM 5000 UNITS: 5000 INJECTION INTRAVENOUS; SUBCUTANEOUS at 02:04

## 2023-04-08 NOTE — PROGRESS NOTES
Pharmacokinetic Assessment Follow Up: IV Vancomycin    Vancomycin serum concentration assessment(s):    The random level was drawn correctly and can be used to guide therapy at this time. The measurement is within the desired definitive target range of 15 to 20 mcg/mL.    Vancomycin Regimen Plan:    Patient with improving MIKEAL, scr up to 7 on admission but has decreased to 3.1 today. Baseline scr ~1.0    Given decline in urine output today, will give vancomycin 1000 mg x1 at 0300 to allow for more clearance of vancomycin.   Re-dose when the random level is less than 20 mcg/mL, next level to be drawn with am labs on 4/9    Drug levels (last 3 results):  Recent Labs   Lab Result Units 04/07/23 2013   Vancomycin, Random ug/mL 18.1       Pharmacy will continue to follow and monitor vancomycin.    Please contact pharmacy at extension [phone] for questions regarding this assessment.    Thank you for the consult,   Sailaja Howard       Patient brief summary:  Cristel Zelaya is a 63 y.o. female initiated on antimicrobial therapy with IV Vancomycin for treatment of sepsis    The patient's current regimen is pulse dosing    Drug Allergies:   Review of patient's allergies indicates:  No Known Allergies    Actual Body Weight:   71.2 kg    Renal Function:   Estimated Creatinine Clearance: 18 mL/min (A) (based on SCr of 3.1 mg/dL (H)).,     Dialysis Method (if applicable):  N/A    CBC (last 72 hours):  Recent Labs   Lab Result Units 04/05/23  1714 04/06/23  0437 04/07/23  0450   WBC K/uL 13.15* 8.81 7.05   Hemoglobin g/dL 10.3* 9.2* 9.0*   Hematocrit % 31.1* 26.5* 27.7*   Platelets K/uL 383 293 280   Gran % % 79.5* 84.1* 84.7*   Lymph % % 6.5* 6.1* 7.8*   Mono % % 8.5 6.5 5.5   Eosinophil % % 0.0 0.1 0.0   Basophil % % 0.8 0.2 0.3   Differential Method  Automated Automated Automated       Metabolic Panel (last 72 hours):  Recent Labs   Lab Result Units 04/05/23  1819 04/05/23  2057 04/06/23  0437 04/06/23  0438  04/06/23  1454 04/07/23  0450   Sodium mmol/L 123*  --  125* 126* 129* 131*   Sodium, Urine mmol/L  --  <10*  --   --   --   --    Potassium mmol/L 4.2  --  3.7 3.9 4.7 3.5   Chloride mmol/L 90*  --  94* 94* 96 100   CO2 mmol/L 16*  --  15* 14* 17* 16*   Glucose mg/dL 109  --  106 108 99 103   Glucose, UA   --  Negative  --   --   --   --    BUN mg/dL 117*  --  110* 113* 102* 90*   Creatinine mg/dL 7.0*  --  5.1* 5.2* 3.9* 3.1*   Albumin g/dL 1.9*  --  1.7* 1.8* 1.8* 1.5*   Total Bilirubin mg/dL 0.5  --  0.5  --  0.6 0.6   Alkaline Phosphatase U/L 99  --  97  --  94 86   AST U/L 64*  --  54*  --  58* 45*   ALT U/L 39  --  35  --  35 32   Magnesium mg/dL 2.1  --  2.1  --   --  2.1   Phosphorus mg/dL 6.1*  --  5.6* 5.7*  --  4.9*       Vancomycin Administrations:  vancomycin given in the last 96 hours                     vancomycin 125 mg/5 mL oral solution 125 mg (mg) 125 mg Given 04/07/23 1728     125 mg Given  1232     125 mg Given  0624     125 mg Given  0100     125 mg Given 04/06/23 1813    vancomycin 1,250 mg in dextrose 5 % (D5W) 250 mL IVPB (Vial-Mate) (mg) 1,250 mg New Bag 04/06/23 2054    vancomycin 125 mg/5 mL oral solution 500 mg (mg) 500 mg Given 04/06/23 0552     500 mg Given  0011                    Microbiologic Results:  Microbiology Results (last 7 days)       Procedure Component Value Units Date/Time    Blood Culture #1 **CANNOT BE ORDERED STAT** [[id number]] Collected: 04/05/23 1922    Order Status: Completed Specimen: Blood from Peripheral, Forearm, Right Updated: 04/06/23 2213     Blood Culture, Routine No Growth to date      No Growth to date    Blood Culture #2 **CANNOT BE ORDERED STAT** [[id number]] Collected: 04/05/23 1922    Order Status: Completed Specimen: Blood from Peripheral, Antecubital, Right Updated: 04/06/23 2213     Blood Culture, Routine No Growth to date      No Growth to date    Stool culture [[id number]]     Order Status: No result Specimen: Stool     Influenza A & B by Molecular  [[id number]] Collected: 04/05/23 1827    Order Status: Completed Specimen: Nasopharyngeal Swab Updated: 04/05/23 1857     Influenza A, Molecular Negative     Influenza B, Molecular Negative     Flu A & B Source Nasal swab    Clostridium difficile EIA [[id number]]     Order Status: Canceled Specimen: Stool

## 2023-04-08 NOTE — HPI
Cristel Zelaya is a 63 year old female with a PMH of HTN, WILMA, CKD, and recent L4-5 posterior decompression and fusion on 3/20 who presented to the ED with abdominal pain, nausea, diarrhea, and weakness. Daughter provided most history on exam. Patient underwent a L4-5 posterior decompression and fusion on 3/20 for chronic LBP TX. She subsequently was hospitalized from 3/20-3/27, followed by inpatient rehab from 3/27-3/31. Per daughter, there were several other patients at the facility who developed diarrhea and had to be admitted.After discharge, she has been staying with her daughter in [place]. She reports that she had had diarrhea over the past few days with 6-7 loose bowel movements/day. No blood reported in her stool. She has also had nausea without vomiting. This morning she developed epigastric abdominal pain which she describes as 8/10, and squeezing. Daughter reports that she has been drinking pedialyte, but over the past day has had decreased po intake, last intake 5:30PM with increased weakness and fatigue. Daughter grew concerned when she became less responsive and seemed more dehydrated with cracked lips at home. No fevers/chills, URI symptoms, urinary symptoms reported.      On arrival, patient with leukocytosis of 13.11, LA increased to 2.4. Blood gas with pH 7.345, pCO2 31.9, pO2 28, HCO3 17.4.  Hyponatremic to 123, hypochloremic to 90, hyperphosphatemia to 6.1. Decreased bicarb of 16. Cr/BUN increased to 7.0/117, baseline Cr ~1.  In the ED, she was given 2 L IV fluid and started on zosyn, metronizole, and po vancomycin given C. Diff concerns given history. Given persistent hypotension 90s/50s despite fluid resuscitation, patient started on levophed. Patient admitted to the MICU for shock requiring pressor support.    ICU course:     CT A/P concerning with colitis. C diff and stool studies ordered, C diff not collected while in ICU. MIKAEL and electrolyte abnormalities slowly improving with IVF.  MRI lumbar spine without concern for infection. Patient able to be weaned off pressors and stepped down to medicine on 4/7.

## 2023-04-08 NOTE — H&P
4/7/2025 2005 Patient arrived to unit. AAOx 4, able to makes needs known staff. Daughter at bedside, vitals WDLs, pedro noted, draining clear yellow urine. Gen. Weakness noted. No apparent distress noted.  4/8/2023  0620 Pedro catheter discontinued per orders, purewick in place to assist with voiding. Pedro removed without difficult, no apparent trauma noted.

## 2023-04-08 NOTE — ASSESSMENT & PLAN NOTE
Resolved  - Phos 6.1 on admission in the setting of MIKAEL, now resolved with improvement in MIKAEL

## 2023-04-08 NOTE — ASSESSMENT & PLAN NOTE
- Hgb 9.3, 11.1 on 2/6 as per care everywhere records  - stool occult blood positive, but no melena, hematochezia--> suspect 2/2 to infection  - monitor for signs of bleeding, daily labs  - if any evidence of bleeding, consider GI consult

## 2023-04-08 NOTE — HOSPITAL COURSE
Mrs. Zelaya was stepped down to hospital medicine for continued treatment of colitis and electrolyte abnormalities.  She continues to have diarrhea and significant abdominal pain.

## 2023-04-08 NOTE — ASSESSMENT & PLAN NOTE
64 yo F w/ HTN, WILMA and recent L4-L5 posterior decompression and fusion on 3/20. Admitted from rehab center w/ abdominal pain, diarrhea and confusion. Reports of diarrheal illness amongst residents at the rehab facility. Hypotensive in the ED. Labs w/ mild leukocytosis, anemia, Na 123, Cl 90, HCO3 16, , Cr 7.0, phos 6.1. Received IVFs in the ED along w/ BSabx. LA 2.4 -->2.2. Admitted to MICU for shock requiring vasopressors. Patient started on IV vanc/zosyn for concern for coliitis, as well as PO vanc for presumed C diff. Able to wean off pressors and patient stepped down 4/7.     - see C diff colitis for treatment and plan

## 2023-04-08 NOTE — ASSESSMENT & PLAN NOTE
Patient with acute kidney injury likely due to IVVD/dehydration MIKAEL is currently improving. Labs reviewed- Renal function/electrolytes with Estimated Creatinine Clearance: 26.5 mL/min (A) (based on SCr of 2.1 mg/dL (H)). according to latest data. Monitor urine output and serial BMP and adjust therapy as needed. Avoid nephrotoxins and renally dose meds for GFR listed above.     - Baseline Cr 1.0, Cr 7.0 on admission --> now 2.1  - continue with mIVF given continued diarrhea  - avoid nephrotoxic agents

## 2023-04-08 NOTE — PROGRESS NOTES
[facility] Medicine  Progress Note    Patient Name: Cristel Zelaya  MRN: [id number]  Patient Class: IP- Inpatient   Admission Date: 4/5/2023  Length of Stay: 3 days  Attending Physician: Daylin Valdez MD  Primary Care Provider: Primary Doctor No        Subjective:     Principal Problem:Shock        HPI:  Cristel Zelaya is a 63 year old female with a PMH of HTN, WILMA, CKD, and recent L4-5 posterior decompression and fusion on 3/20 who presented to the ED with abdominal pain, nausea, diarrhea, and weakness. Daughter provided most history on exam. Patient underwent a L4-5 posterior decompression and fusion on 3/20 for chronic LBP TX. She subsequently was hospitalized from 3/20-3/27, followed by inpatient rehab from 3/27-3/31. Per daughter, there were several other patients at the facility who developed diarrhea and had to be admitted.After discharge, she has been staying with her daughter in [place]. She reports that she had had diarrhea over the past few days with 6-7 loose bowel movements/day. No blood reported in her stool. She has also had nausea without vomiting. This morning she developed epigastric abdominal pain which she describes as 8/10, and squeezing. Daughter reports that she has been drinking pedialyte, but over the past day has had decreased po intake, last intake 5:30PM with increased weakness and fatigue. Daughter grew concerned when she became less responsive and seemed more dehydrated with cracked lips at home. No fevers/chills, URI symptoms, urinary symptoms reported.      On arrival, patient with leukocytosis of 13.11, LA increased to 2.4. Blood gas with pH 7.345, pCO2 31.9, pO2 28, HCO3 17.4.  Hyponatremic to 123, hypochloremic to 90, hyperphosphatemia to 6.1. Decreased bicarb of 16. Cr/BUN increased to 7.0/117, baseline Cr ~1.  In the ED, she was given 2 L IV fluid and started on zosyn, metronizole, and po vancomycin given C. Diff concerns given history.  Given persistent hypotension 90s/50s despite fluid resuscitation, patient started on levophed. Patient admitted to the MICU for shock requiring pressor support.    ICU course:     CT A/P concerning with colitis. C diff and stool studies ordered, C diff not collected while in ICU. MIKAEL and electrolyte abnormalities slowly improving with IVF. MRI lumbar spine without concern for infection. Patient able to be weaned off pressors and stepped down to medicine on 4/7.      Overview/Hospital Course:  Mrs. Zelaya was stepped down to hospital medicine for continued treatment of colitis and electrolyte abnormalities.       Interval History: Patient reports significant abdominal discomfort and profuse watery diarrhea. She denies any improvement, has had two to three episodes today. C diff not collected in ICU with other stool studies, able to be added on and positive. Continue oral vancomycin, stop IV Vanc and zosyn. MIKAEL and electrolyte disturbances improving.     Review of Systems   Constitutional:  Positive for appetite change, chills and fatigue. Negative for fever.   HENT:  Negative for congestion and rhinorrhea.    Respiratory:  Negative for cough, chest tightness and shortness of breath.    Cardiovascular:  Negative for chest pain, palpitations and leg swelling.   Gastrointestinal:  Positive for abdominal pain, diarrhea and nausea. Negative for abdominal distention, constipation and vomiting.   Genitourinary:  Negative for difficulty urinating, dysuria, frequency and hematuria.   Musculoskeletal:  Positive for arthralgias and back pain. Negative for myalgias.   Neurological:  Positive for weakness. Negative for dizziness, syncope, light-headedness and headaches.   Psychiatric/Behavioral:  Negative for agitation, behavioral problems and confusion.    Objective:     Vital Signs (Most Recent):  Temp: 98.5 °F (36.9 °C) (04/08/23 1217)  Pulse: 90 (04/08/23 1217)  Resp: 18 (04/08/23 1410)  BP: (!) 116/59 (04/08/23 1217)  SpO2:  95 % (04/08/23 1217)   Vital Signs (24h Range):  Temp:  [98.2 °F (36.8 °C)-100.9 °F (38.3 °C)] 98.5 °F (36.9 °C)  Pulse:  [] 90  Resp:  [16-27] 18  SpO2:  [95 %-100 %] 95 %  BP: ()/(50-69) 116/59     Weight: 71.2 kg (156 lb 14.4 oz)  Body mass index is 26.93 kg/m².    Intake/Output Summary (Last 24 hours) at 4/8/2023 1413  Last data filed at 4/8/2023 1342  Gross per 24 hour   Intake --   Output 1287 ml   Net -1287 ml      Physical Exam  Vitals and nursing note reviewed.   Constitutional:       Appearance: She is normal weight. She is ill-appearing.   HENT:      Head: Normocephalic and atraumatic.      Nose: Nose normal. No congestion.      Mouth/Throat:      Mouth: Mucous membranes are dry.      Pharynx: Oropharynx is clear.   Eyes:      Extraocular Movements: Extraocular movements intact.      Conjunctiva/sclera: Conjunctivae normal.      Pupils: Pupils are equal, round, and reactive to light.   Cardiovascular:      Rate and Rhythm: Normal rate and regular rhythm.      Pulses: Normal pulses.      Heart sounds: Normal heart sounds. No murmur heard.  Pulmonary:      Effort: Pulmonary effort is normal.      Breath sounds: Normal breath sounds. No wheezing, rhonchi or rales.   Chest:      Chest wall: No tenderness.   Abdominal:      General: Abdomen is flat. Bowel sounds are increased.      Palpations: Abdomen is soft.      Tenderness: There is abdominal tenderness. There is no right CVA tenderness, left CVA tenderness, guarding or rebound.      Comments: Generalized tenderness, worse in periumbilical and lower quadrants   Neurological:      Mental Status: She is alert.       Significant Labs: All pertinent labs within the past 24 hours have been reviewed.  Bilirubin:   Recent Labs   Lab 04/05/23  1819 04/06/23  0437 04/06/23  1454 04/07/23  0450 04/08/23  0601   BILITOT 0.5 0.5 0.6 0.6 0.5       CBC:   Recent Labs   Lab 04/07/23  0450 04/08/23  0601   WBC 7.05 6.33   HGB 9.0* 9.3*   HCT 27.7* 28.6*   PLT  280 293     CMP:   Recent Labs   Lab 04/06/23  1454 04/07/23  0450 04/08/23  0601   * 131* 135*   K 4.7 3.5 3.3*   CL 96 100 103   CO2 17* 16* 21*   GLU 99 103 112*   * 90* 78*   CREATININE 3.9* 3.1* 2.1*   CALCIUM 7.8* 7.9* 8.0*   PROT 5.3* 5.2* 5.3*   ALBUMIN 1.8* 1.5* 1.6*   BILITOT 0.6 0.6 0.5   ALKPHOS 94 86 115   AST 58* 45* 43*   ALT 35 32 29   ANIONGAP 16 15 11       Magnesium:   Recent Labs   Lab 04/07/23  0450 04/08/23  0601   MG 2.1 2.2       Significant Imaging: I have reviewed all pertinent imaging results/findings within the past 24 hours.      Assessment/Plan:      * Shock  64 yo F w/ HTN, WILMA and recent L4-L5 posterior decompression and fusion on 3/20. Admitted from rehab center w/ abdominal pain, diarrhea and confusion. Reports of diarrheal illness amongst residents at the rehab facility. Hypotensive in the ED. Labs w/ mild leukocytosis, anemia, Na 123, Cl 90, HCO3 16, , Cr 7.0, phos 6.1. Received IVFs in the ED along w/ BSabx. LA 2.4 -->2.2. Admitted to MICU for shock requiring vasopressors. Patient started on IV vanc/zosyn for concern for coliitis, as well as PO vanc for presumed C diff. Able to wean off pressors and patient stepped down 4/7.     - see C diff colitis for treatment and plan    C. difficile colitis  - CT A/P with colitis  - C diff antigen positive, toxin negative, PCR positive  - continue Vancomycin PO   - discontinue zosyn/ vancomycin  - blood cultures NGTD X 2  - continue mIVF  - CLD, advance as tolerated  - C diff precautions    Hypokalemia  - replacing PO    S/P lumbar fusion  S/p L4-5 posterior decompression and fusion on 3/20 and admitted to rehab 3/27-3/31  - MRI unremarkable for infection source   - pain control  - PT/OT  - will likely need to return to rehab    Hypoalbuminemia  - boost TIDWM      Hyperphosphatemia  Resolved  - Phos 6.1 on admission in the setting of MIKAEL, now resolved with improvement in MIKAEL    Increased anion gap metabolic acidosis  - in  setting of c diff colitis and prerenal MIKAEL  - improving   - started on sodium bicarb in ICU, will discontinue     Hyponatremia  - Na 123 on admit, currently 135  - in the setting of hypovolemia given ongoing diarrhea  - improvement w/ volume resuscitation    Anemia  - Hgb 9.3, 11.1 on 2/6 as per care everywhere records  - stool occult blood positive, but no melena, hematochezia--> suspect 2/2 to infection  - monitor for signs of bleeding, daily labs  - if any evidence of bleeding, consider GI consult    MIKAEL (acute kidney injury)  Patient with acute kidney injury likely due to IVVD/dehydration MIKAEL is currently improving. Labs reviewed- Renal function/electrolytes with Estimated Creatinine Clearance: 26.5 mL/min (A) (based on SCr of 2.1 mg/dL (H)). according to latest data. Monitor urine output and serial BMP and adjust therapy as needed. Avoid nephrotoxins and renally dose meds for GFR listed above.     - Baseline Cr 1.0, Cr 7.0 on admission --> now 2.1  - continue with mIVF given continued diarrhea  - avoid nephrotoxic agents    Generalized abdominal pain  - due to C diff, monitor for improvement      VTE Risk Mitigation (From admission, onward)         Ordered     heparin (porcine) injection 5,000 Units  Every 8 hours         04/06/23 1335     Place sequential compression device  Until discontinued         04/05/23 2014     IP VTE LOW RISK PATIENT  Once         04/05/23 2014                Discharge Planning   DAISY: 4/14/2023     Code Status: Full Code   Is the patient medically ready for discharge?: No    Reason for patient still in hospital (select all that apply): Patient new problem, Patient trending condition, Laboratory test and Treatment  Discharge Plan A: Home Health, Home with family                  Rosemarie Roger PA-C  Department of Hospital Medicine   [facility] - Telemetry Stepdown

## 2023-04-08 NOTE — ASSESSMENT & PLAN NOTE
- Na 123 on admit, currently 135  - in the setting of hypovolemia given ongoing diarrhea  - improvement w/ volume resuscitation

## 2023-04-08 NOTE — ASSESSMENT & PLAN NOTE
- in setting of c diff colitis and prerenal MIKAEL  - improving   - started on sodium bicarb in ICU, will discontinue

## 2023-04-08 NOTE — PROGRESS NOTES
Pharmacist Renal Dose Adjustment Note    Cristel Zelaya is a 63 y.o. female being treated with the medication piperacillin/tazobactam    Patient Data:    Vital Signs (Most Recent):  Temp: 98.2 °F (36.8 °C) (04/08/23 0737)  Pulse: 95 (04/08/23 0737)  Resp: 18 (04/08/23 0737)  BP: 119/63 (04/08/23 0737)  SpO2: 98 % (04/08/23 0737) Vital Signs (72h Range):  Temp:  [98 °F (36.7 °C)-101.8 °F (38.8 °C)]   Pulse:  []   Resp:  [16-47]   BP: ()/(42-88)   SpO2:  [95 %-100 %]      Recent Labs   Lab 04/06/23  1454 04/07/23  0450 04/08/23  0601   CREATININE 3.9* 3.1* 2.1*     Serum creatinine: 2.1 mg/dL (H) 04/08/23 0601  Estimated creatinine clearance: 26.5 mL/min (A)    Medication:piperacillin/tazobactam dose: 4.5g frequency q12h will be changed to medication:piperacillin/tazobactam dose:4.5g frequency:q8h for CrCl > 20    Pharmacist's Name: Sanchez Butler  Pharmacist's Extension: [phone]

## 2023-04-08 NOTE — ASSESSMENT & PLAN NOTE
S/p L4-5 posterior decompression and fusion on 3/20 and admitted to rehab 3/27-3/31  - MRI unremarkable for infection source   - pain control  - PT/OT  - will likely need to return to rehab

## 2023-04-08 NOTE — PLAN OF CARE
Patient is AAOX4, on RA, vitals are WNL. Pt still having multiple liquid stools, 3-4. Pt voiding post pedro catheter removal. Purewick in place. Daughter notified of Cdiff positive results. Pt complains of lower back pain, PRNs administered. Pt tolerating clear liquid diet. Pt states no further needs or concerns att. Will continue to monitor att.

## 2023-04-08 NOTE — SUBJECTIVE & OBJECTIVE
Interval History: Patient reports significant abdominal discomfort and profuse watery diarrhea. She denies any improvement, has had two to three episodes today. C diff not collected in ICU with other stool studies, able to be added on and positive. Continue oral vancomycin, stop IV Vanc and zosyn. MIKAEL and electrolyte disturbances improving.     Review of Systems   Constitutional:  Positive for appetite change, chills and fatigue. Negative for fever.   HENT:  Negative for congestion and rhinorrhea.    Respiratory:  Negative for cough, chest tightness and shortness of breath.    Cardiovascular:  Negative for chest pain, palpitations and leg swelling.   Gastrointestinal:  Positive for abdominal pain, diarrhea and nausea. Negative for abdominal distention, constipation and vomiting.   Genitourinary:  Negative for difficulty urinating, dysuria, frequency and hematuria.   Musculoskeletal:  Positive for arthralgias and back pain. Negative for myalgias.   Neurological:  Positive for weakness. Negative for dizziness, syncope, light-headedness and headaches.   Psychiatric/Behavioral:  Negative for agitation, behavioral problems and confusion.    Objective:     Vital Signs (Most Recent):  Temp: 98.5 °F (36.9 °C) (04/08/23 1217)  Pulse: 90 (04/08/23 1217)  Resp: 18 (04/08/23 1410)  BP: (!) 116/59 (04/08/23 1217)  SpO2: 95 % (04/08/23 1217)   Vital Signs (24h Range):  Temp:  [98.2 °F (36.8 °C)-100.9 °F (38.3 °C)] 98.5 °F (36.9 °C)  Pulse:  [] 90  Resp:  [16-27] 18  SpO2:  [95 %-100 %] 95 %  BP: ()/(50-69) 116/59     Weight: 71.2 kg (156 lb 14.4 oz)  Body mass index is 26.93 kg/m².    Intake/Output Summary (Last 24 hours) at 4/8/2023 1413  Last data filed at 4/8/2023 1342  Gross per 24 hour   Intake --   Output 1287 ml   Net -1287 ml      Physical Exam  Vitals and nursing note reviewed.   Constitutional:       Appearance: She is normal weight. She is ill-appearing.   HENT:      Head: Normocephalic and atraumatic.       Nose: Nose normal. No congestion.      Mouth/Throat:      Mouth: Mucous membranes are dry.      Pharynx: Oropharynx is clear.   Eyes:      Extraocular Movements: Extraocular movements intact.      Conjunctiva/sclera: Conjunctivae normal.      Pupils: Pupils are equal, round, and reactive to light.   Cardiovascular:      Rate and Rhythm: Normal rate and regular rhythm.      Pulses: Normal pulses.      Heart sounds: Normal heart sounds. No murmur heard.  Pulmonary:      Effort: Pulmonary effort is normal.      Breath sounds: Normal breath sounds. No wheezing, rhonchi or rales.   Chest:      Chest wall: No tenderness.   Abdominal:      General: Abdomen is flat. Bowel sounds are increased.      Palpations: Abdomen is soft.      Tenderness: There is abdominal tenderness. There is no right CVA tenderness, left CVA tenderness, guarding or rebound.      Comments: Generalized tenderness, worse in periumbilical and lower quadrants   Neurological:      Mental Status: She is alert.       Significant Labs: All pertinent labs within the past 24 hours have been reviewed.  Bilirubin:   Recent Labs   Lab 04/05/23  1819 04/06/23  0437 04/06/23  1454 04/07/23  0450 04/08/23  0601   BILITOT 0.5 0.5 0.6 0.6 0.5       CBC:   Recent Labs   Lab 04/07/23  0450 04/08/23  0601   WBC 7.05 6.33   HGB 9.0* 9.3*   HCT 27.7* 28.6*    293     CMP:   Recent Labs   Lab 04/06/23  1454 04/07/23  0450 04/08/23  0601   * 131* 135*   K 4.7 3.5 3.3*   CL 96 100 103   CO2 17* 16* 21*   GLU 99 103 112*   * 90* 78*   CREATININE 3.9* 3.1* 2.1*   CALCIUM 7.8* 7.9* 8.0*   PROT 5.3* 5.2* 5.3*   ALBUMIN 1.8* 1.5* 1.6*   BILITOT 0.6 0.6 0.5   ALKPHOS 94 86 115   AST 58* 45* 43*   ALT 35 32 29   ANIONGAP 16 15 11       Magnesium:   Recent Labs   Lab 04/07/23  0450 04/08/23  0601   MG 2.1 2.2       Significant Imaging: I have reviewed all pertinent imaging results/findings within the past 24 hours.

## 2023-04-08 NOTE — CARE UPDATE
RAPID RESPONSE NURSE PROACTIVE ROUNDING NOTE       Time of Visit:     Admit Date: 2023  LOS: 2  Code Status: Full Code   Date of Visit: 2023  : 1960  Age: 63 y.o.  Sex: female  Race: Black or   Bed: 8006/8085 A:   MRN: [id number]  Was the patient discharged from an ICU this admission? Yes   Was the patient discharged from a PACU within last 24 hours? No   Did the patient receive conscious sedation/general anesthesia in last 24 hours? No  Was the patient in the ED within the past 24 hours? No  Was the patient on NIPPV within the past 24 hours? No   Attending Physician: Padmaja Clay MD  Primary Service: [facility] MED    Time spent at the bedside: < 15 min    SITUATION    Notified by JUNIOR.  Reason for alert: Recent step down from MICU with pending lower lumbar MRI result  Called to evaluate the patient for    pt status    BACKGROUND     Why is the patient in the hospital?: Shock    Patient has no past medical history on file.    Last Vitals:  Temp: 98.8 °F (37.1 °C) (2005)  Pulse: 106 (2005)  Resp: 20 (2155)  BP: 120/59 (2005)  SpO2: 96 % (2005)    24 Hours Vitals Range:  Temp:  [98.8 °F (37.1 °C)-101.5 °F (38.6 °C)]   Pulse:  []   Resp:  [18-37]   BP: ()/(50-69)   SpO2:  [96 %-100 %]     Labs:  Recent Labs     23  1714 23  0437 23  0450   WBC 13.15* 8.81 7.05   HGB 10.3* 9.2* 9.0*   HCT 31.1* 26.5* 27.7*    293 280       Recent Labs     23  1819 23  0437 23  0438 23  1454 23  0450   * 125* 126* 129* 131*   K 4.2 3.7 3.9 4.7 3.5   CL 90* 94* 94* 96 100   CO2 16* 15* 14* 17* 16*   CREATININE 7.0* 5.1* 5.2* 3.9* 3.1*    106 108 99 103   PHOS 6.1* 5.6* 5.7*  --  4.9*   MG 2.1 2.1  --   --  2.1        Recent Labs     23  1450   PH 7.345* 7.437   PCO2 31.9* 31.5*   PO2 28* 29*   HCO3 17.4* 21.2*   POCSATURATED 51* 58*   BE -8 -3         ASSESSMENT    Physical Exam  Constitutional:       Appearance: She is ill-appearing.   HENT:      Mouth/Throat:      Mouth: Mucous membranes are moist.      Pharynx: Oropharynx is clear.   Eyes:      Pupils: Pupils are equal, round, and reactive to light.   Cardiovascular:      Rate and Rhythm: Normal rate and regular rhythm.   Pulmonary:      Effort: Pulmonary effort is normal.      Breath sounds: Normal breath sounds.   Abdominal:      Tenderness: There is abdominal tenderness.   Musculoskeletal:      Cervical back: Pain with movement present.      Lumbar back: Tenderness present. Decreased range of motion.      Comments: Lower lumbar incision (healed)   Skin:     General: Skin is warm and dry.      Capillary Refill: Capillary refill takes less than 2 seconds.   Neurological:      Mental Status: She is oriented to person, place, and time.      GCS: GCS eye subscore is 4. GCS verbal subscore is 5. GCS motor subscore is 6.      Sensory: Sensation is intact.      Motor: Weakness present.       INTERVENTIONS    Neuro exam completed    RECOMMENDATIONS    Follow up on MRI, notify Rapid Response and primary for acute decompensation in mentation, vital signs and increased weakness.     PROVIDER ESCALATION    Yes/No  no    Orders received and case discussed with NA.    Disposition: Remain in room 8085.    FOLLOW-UP    Bedside Delroy MCKEON  updated on plan of care. Instructed to call the Rapid Response Nurse, Rainer Alejo RN at [phone] for additional questions or concerns.

## 2023-04-08 NOTE — PROGRESS NOTES
Therapy with vancomycin complete and/or consult discontinued by provider.  Pharmacy will sign off, please re-consult as needed.     [facility] Sharonda PharmD. Ext.[phone].

## 2023-04-08 NOTE — ASSESSMENT & PLAN NOTE
- CT A/P with colitis  - C diff antigen positive, toxin negative, PCR positive  - continue Vancomycin PO   - discontinue zosyn/ vancomycin  - blood cultures NGTD X 2  - continue mIVF  - CLD, advance as tolerated  - C diff precautions

## 2023-04-09 PROBLEM — R65.21 SEPTIC SHOCK: Status: ACTIVE | Noted: 2023-04-05

## 2023-04-09 PROBLEM — A41.9 SEPTIC SHOCK: Status: ACTIVE | Noted: 2023-04-05

## 2023-04-09 LAB
ALBUMIN SERPL BCP-MCNC: 1.8 G/DL (ref 3.5–5.2)
ALP SERPL-CCNC: 182 U/L (ref 55–135)
ALT SERPL W/O P-5'-P-CCNC: 40 U/L (ref 10–44)
ANION GAP SERPL CALC-SCNC: 14 MMOL/L (ref 8–16)
ANISOCYTOSIS BLD QL SMEAR: SLIGHT
AST SERPL-CCNC: 76 U/L (ref 10–40)
BASOPHILS # BLD AUTO: 0.02 K/UL (ref 0–0.2)
BASOPHILS NFR BLD: 0.4 % (ref 0–1.9)
BILIRUB SERPL-MCNC: 0.5 MG/DL (ref 0.1–1)
BUN SERPL-MCNC: 56 MG/DL (ref 8–23)
BURR CELLS BLD QL SMEAR: ABNORMAL
CALCIUM SERPL-MCNC: 8.3 MG/DL (ref 8.7–10.5)
CHLORIDE SERPL-SCNC: 106 MMOL/L (ref 95–110)
CO2 SERPL-SCNC: 16 MMOL/L (ref 23–29)
CREAT SERPL-MCNC: 1.7 MG/DL (ref 0.5–1.4)
DIFFERENTIAL METHOD: ABNORMAL
EOSINOPHIL # BLD AUTO: 0 K/UL (ref 0–0.5)
EOSINOPHIL NFR BLD: 0.4 % (ref 0–8)
ERYTHROCYTE [DISTWIDTH] IN BLOOD BY AUTOMATED COUNT: 14.2 % (ref 11.5–14.5)
EST. GFR  (NO RACE VARIABLE): 33.5 ML/MIN/1.73 M^2
GLUCOSE SERPL-MCNC: 91 MG/DL (ref 70–110)
HCT VFR BLD AUTO: 32.4 % (ref 37–48.5)
HGB BLD-MCNC: 10.2 G/DL (ref 12–16)
HYPOCHROMIA BLD QL SMEAR: ABNORMAL
IMM GRANULOCYTES # BLD AUTO: 0.19 K/UL (ref 0–0.04)
IMM GRANULOCYTES NFR BLD AUTO: 3.5 % (ref 0–0.5)
LYMPHOCYTES # BLD AUTO: 0.8 K/UL (ref 1–4.8)
LYMPHOCYTES NFR BLD: 15.4 % (ref 18–48)
MAGNESIUM SERPL-MCNC: 2 MG/DL (ref 1.6–2.6)
MCH RBC QN AUTO: 25.1 PG (ref 27–31)
MCHC RBC AUTO-ENTMCNC: 31.5 G/DL (ref 32–36)
MCV RBC AUTO: 80 FL (ref 82–98)
MONOCYTES # BLD AUTO: 0.3 K/UL (ref 0.3–1)
MONOCYTES NFR BLD: 5.1 % (ref 4–15)
NEUTROPHILS # BLD AUTO: 4.1 K/UL (ref 1.8–7.7)
NEUTROPHILS NFR BLD: 75.2 % (ref 38–73)
NRBC BLD-RTO: 0 /100 WBC
OVALOCYTES BLD QL SMEAR: ABNORMAL
PHOSPHATE SERPL-MCNC: 2.4 MG/DL (ref 2.7–4.5)
PLATELET # BLD AUTO: 274 K/UL (ref 150–450)
PLATELET BLD QL SMEAR: ABNORMAL
PMV BLD AUTO: 10.3 FL (ref 9.2–12.9)
POIKILOCYTOSIS BLD QL SMEAR: ABNORMAL
POLYCHROMASIA BLD QL SMEAR: ABNORMAL
POTASSIUM SERPL-SCNC: 4 MMOL/L (ref 3.5–5.1)
PROT SERPL-MCNC: 6.1 G/DL (ref 6–8.4)
RBC # BLD AUTO: 4.06 M/UL (ref 4–5.4)
SCHISTOCYTES BLD QL SMEAR: ABNORMAL
SODIUM SERPL-SCNC: 136 MMOL/L (ref 136–145)
WBC # BLD AUTO: 5.44 K/UL (ref 3.9–12.7)

## 2023-04-09 PROCEDURE — 97530 THERAPEUTIC ACTIVITIES: CPT

## 2023-04-09 PROCEDURE — 99233 PR SUBSEQUENT HOSPITAL CARE,LEVL III: ICD-10-PCS | Mod: ,,, | Performed by: HOSPITALIST

## 2023-04-09 PROCEDURE — 63600175 PHARM REV CODE 636 W HCPCS: Performed by: STUDENT IN AN ORGANIZED HEALTH CARE EDUCATION/TRAINING PROGRAM

## 2023-04-09 PROCEDURE — 80053 COMPREHEN METABOLIC PANEL: CPT | Performed by: NURSE PRACTITIONER

## 2023-04-09 PROCEDURE — 25000003 PHARM REV CODE 250: Performed by: INTERNAL MEDICINE

## 2023-04-09 PROCEDURE — 63600175 PHARM REV CODE 636 W HCPCS: Performed by: HOSPITALIST

## 2023-04-09 PROCEDURE — 20600001 HC STEP DOWN PRIVATE ROOM

## 2023-04-09 PROCEDURE — 63600175 PHARM REV CODE 636 W HCPCS: Performed by: INTERNAL MEDICINE

## 2023-04-09 PROCEDURE — 84100 ASSAY OF PHOSPHORUS: CPT | Performed by: NURSE PRACTITIONER

## 2023-04-09 PROCEDURE — 27000207 HC ISOLATION

## 2023-04-09 PROCEDURE — 25000003 PHARM REV CODE 250: Performed by: HOSPITALIST

## 2023-04-09 PROCEDURE — 63600175 PHARM REV CODE 636 W HCPCS: Performed by: NURSE PRACTITIONER

## 2023-04-09 PROCEDURE — 25000003 PHARM REV CODE 250: Performed by: NURSE PRACTITIONER

## 2023-04-09 PROCEDURE — 25000003 PHARM REV CODE 250: Performed by: PHYSICIAN ASSISTANT

## 2023-04-09 PROCEDURE — 99233 SBSQ HOSP IP/OBS HIGH 50: CPT | Mod: ,,, | Performed by: HOSPITALIST

## 2023-04-09 PROCEDURE — 25000003 PHARM REV CODE 250: Performed by: CLINICAL NURSE SPECIALIST

## 2023-04-09 PROCEDURE — 97535 SELF CARE MNGMENT TRAINING: CPT

## 2023-04-09 PROCEDURE — 36415 COLL VENOUS BLD VENIPUNCTURE: CPT | Performed by: NURSE PRACTITIONER

## 2023-04-09 PROCEDURE — 85025 COMPLETE CBC W/AUTO DIFF WBC: CPT | Performed by: NURSE PRACTITIONER

## 2023-04-09 PROCEDURE — 97166 OT EVAL MOD COMPLEX 45 MIN: CPT

## 2023-04-09 PROCEDURE — 83735 ASSAY OF MAGNESIUM: CPT | Performed by: NURSE PRACTITIONER

## 2023-04-09 RX ORDER — HYDROMORPHONE HYDROCHLORIDE 1 MG/ML
0.5 INJECTION, SOLUTION INTRAMUSCULAR; INTRAVENOUS; SUBCUTANEOUS EVERY 4 HOURS PRN
Status: DISCONTINUED | OUTPATIENT
Start: 2023-04-09 | End: 2023-04-13 | Stop reason: HOSPADM

## 2023-04-09 RX ORDER — SODIUM BICARBONATE 650 MG/1
650 TABLET ORAL 3 TIMES DAILY
Status: DISCONTINUED | OUTPATIENT
Start: 2023-04-09 | End: 2023-04-10

## 2023-04-09 RX ORDER — HYDROMORPHONE HYDROCHLORIDE 1 MG/ML
0.5 INJECTION, SOLUTION INTRAMUSCULAR; INTRAVENOUS; SUBCUTANEOUS ONCE
Status: COMPLETED | OUTPATIENT
Start: 2023-04-09 | End: 2023-04-09

## 2023-04-09 RX ORDER — DORZOLAMIDE HCL 20 MG/ML
1 SOLUTION/ DROPS OPHTHALMIC 2 TIMES DAILY
Status: DISCONTINUED | OUTPATIENT
Start: 2023-04-09 | End: 2023-04-13 | Stop reason: HOSPADM

## 2023-04-09 RX ORDER — LATANOPROST 50 UG/ML
1 SOLUTION/ DROPS OPHTHALMIC NIGHTLY
Status: DISCONTINUED | OUTPATIENT
Start: 2023-04-09 | End: 2023-04-13 | Stop reason: HOSPADM

## 2023-04-09 RX ADMIN — HEPARIN SODIUM 5000 UNITS: 5000 INJECTION INTRAVENOUS; SUBCUTANEOUS at 05:04

## 2023-04-09 RX ADMIN — HEPARIN SODIUM 5000 UNITS: 5000 INJECTION INTRAVENOUS; SUBCUTANEOUS at 08:04

## 2023-04-09 RX ADMIN — Medication 1 CAPSULE: at 08:04

## 2023-04-09 RX ADMIN — OXYCODONE HYDROCHLORIDE 5 MG: 5 TABLET ORAL at 04:04

## 2023-04-09 RX ADMIN — HYDROMORPHONE HYDROCHLORIDE 0.5 MG: 1 INJECTION, SOLUTION INTRAMUSCULAR; INTRAVENOUS; SUBCUTANEOUS at 10:04

## 2023-04-09 RX ADMIN — HYDROMORPHONE HYDROCHLORIDE 0.5 MG: 1 INJECTION, SOLUTION INTRAMUSCULAR; INTRAVENOUS; SUBCUTANEOUS at 08:04

## 2023-04-09 RX ADMIN — HYDROMORPHONE HYDROCHLORIDE 0.5 MG: 1 INJECTION, SOLUTION INTRAMUSCULAR; INTRAVENOUS; SUBCUTANEOUS at 06:04

## 2023-04-09 RX ADMIN — OXYCODONE HYDROCHLORIDE 5 MG: 5 TABLET ORAL at 10:04

## 2023-04-09 RX ADMIN — VANCOMYCIN HYDROCHLORIDE 125 MG: KIT at 03:04

## 2023-04-09 RX ADMIN — VANCOMYCIN HYDROCHLORIDE 125 MG: KIT at 08:04

## 2023-04-09 RX ADMIN — DORZOLAMIDE HYDROCHLORIDE 1 DROP: 20 SOLUTION OPHTHALMIC at 08:04

## 2023-04-09 RX ADMIN — OXYCODONE HYDROCHLORIDE 5 MG: 5 TABLET ORAL at 08:04

## 2023-04-09 RX ADMIN — DIBASIC SODIUM PHOSPHATE, MONOBASIC POTASSIUM PHOSPHATE AND MONOBASIC SODIUM PHOSPHATE 1 TABLET: 852; 155; 130 TABLET ORAL at 10:04

## 2023-04-09 RX ADMIN — LIDOCAINE 1 PATCH: 50 PATCH CUTANEOUS at 08:04

## 2023-04-09 RX ADMIN — DIBASIC SODIUM PHOSPHATE, MONOBASIC POTASSIUM PHOSPHATE AND MONOBASIC SODIUM PHOSPHATE 1 TABLET: 852; 155; 130 TABLET ORAL at 08:04

## 2023-04-09 RX ADMIN — DIBASIC SODIUM PHOSPHATE, MONOBASIC POTASSIUM PHOSPHATE AND MONOBASIC SODIUM PHOSPHATE 1 TABLET: 852; 155; 130 TABLET ORAL at 04:04

## 2023-04-09 RX ADMIN — LATANOPROST 1 DROP: 50 SOLUTION OPHTHALMIC at 08:04

## 2023-04-09 RX ADMIN — SODIUM BICARBONATE 650 MG: 650 TABLET ORAL at 10:04

## 2023-04-09 RX ADMIN — HYDROMORPHONE HYDROCHLORIDE 0.5 MG: 1 INJECTION, SOLUTION INTRAMUSCULAR; INTRAVENOUS; SUBCUTANEOUS at 05:04

## 2023-04-09 RX ADMIN — SODIUM BICARBONATE 650 MG: 650 TABLET ORAL at 08:04

## 2023-04-09 RX ADMIN — HEPARIN SODIUM 5000 UNITS: 5000 INJECTION INTRAVENOUS; SUBCUTANEOUS at 03:04

## 2023-04-09 RX ADMIN — SODIUM BICARBONATE 650 MG: 650 TABLET ORAL at 03:04

## 2023-04-09 RX ADMIN — SODIUM CHLORIDE, POTASSIUM CHLORIDE, SODIUM LACTATE AND CALCIUM CHLORIDE: 600; 310; 30; 20 INJECTION, SOLUTION INTRAVENOUS at 03:04

## 2023-04-09 NOTE — ASSESSMENT & PLAN NOTE
- Hgb 9.3, 11.1 on 2/6 as per care everywhere records  - stool occult blood positive, but no melena, hematochezia--> suspect 2/2 to c diff infection  - monitor for signs of bleeding, daily labs  - if any evidence of bleeding, consider GI consult

## 2023-04-09 NOTE — SUBJECTIVE & OBJECTIVE
Interval History: continues to have significant abdominal pain  and diarrhea. No dyspnea, nausea, chest pain. Several questions about what caused her to be ill. Asks me to speak to her daughter      Objective:     Vital Signs (Most Recent):  Temp: 98.1 °F (36.7 °C) (04/09/23 0814)  Pulse: 105 (04/09/23 1030)  Resp: 18 (04/09/23 1028)  BP: 137/71 (04/09/23 0814)  SpO2: 98 % (04/09/23 0814) Vital Signs (24h Range):  Temp:  [98.1 °F (36.7 °C)-98.5 °F (36.9 °C)] 98.1 °F (36.7 °C)  Pulse:  [] 105  Resp:  [18] 18  SpO2:  [90 %-99 %] 98 %  BP: (113-144)/(59-71) 137/71     Weight: 71.2 kg (156 lb 14.4 oz)  Body mass index is 26.93 kg/m².    Intake/Output Summary (Last 24 hours) at 4/9/2023 1209  Last data filed at 4/8/2023 2150  Gross per 24 hour   Intake 100 ml   Output 505 ml   Net -405 ml      Physical Exam  Vitals and nursing note reviewed.   Constitutional:       Appearance: She is normal weight. She is ill-appearing.   HENT:      Nose: Nose normal. No congestion.      Mouth/Throat:      Mouth: Mucous membranes are dry.      Pharynx: Oropharynx is clear.   Eyes:      Extraocular Movements: Extraocular movements intact.      Conjunctiva/sclera: Conjunctivae normal.   Cardiovascular:      Rate and Rhythm: Normal rate and regular rhythm.      Heart sounds: Normal heart sounds. No murmur heard.  Pulmonary:      Effort: Pulmonary effort is normal.      Breath sounds: Normal breath sounds. No wheezing, rhonchi or rales.   Chest:      Chest wall: No tenderness.   Abdominal:      General: Abdomen is flat. Bowel sounds are increased.      Palpations: Abdomen is soft.      Tenderness: There is abdominal tenderness. There is no right CVA tenderness, left CVA tenderness, guarding or rebound.      Comments: Generalized tenderness, no rebound, good bowel sounds   Skin:     General: Skin is warm and dry.   Neurological:      General: No focal deficit present.      Mental Status: She is alert.      Comments: Slow to answer and  ask questions       Significant Labs: All pertinent labs within the past 24 hours have been reviewed.  CBC:   Recent Labs   Lab 04/08/23  0601 04/09/23  0809   WBC 6.33 5.44   HGB 9.3* 10.2*   HCT 28.6* 32.4*    274     CMP:   Recent Labs   Lab 04/08/23  0601 04/09/23  0524   * 136   K 3.3* 4.0    106   CO2 21* 16*   * 91   BUN 78* 56*   CREATININE 2.1* 1.7*   CALCIUM 8.0* 8.3*   PROT 5.3* 6.1   ALBUMIN 1.6* 1.8*   BILITOT 0.5 0.5   ALKPHOS 115 182*   AST 43* 76*   ALT 29 40   ANIONGAP 11 14       Significant Imaging: I have reviewed all pertinent imaging results/findings within the past 24 hours.

## 2023-04-09 NOTE — ASSESSMENT & PLAN NOTE
2/2 c diff colitis  Admitted from rehab center w/ abdominal pain, diarrhea and confusion. Hypotensive in the ED. Labs w/ mild leukocytosis, anemia, Na 123, HCO3 16, , Cr 7.0, phos 6.1. Received IVFs in the ED along w/ BSabx. LA 2.4 -->2.2. Admitted to MICU for shock requiring vasopressors. Patient started on IV vanc/zosyn for concern for coliitis, as well as PO vanc for presumed C diff. Able to wean off pressors and patient stepped down 4/7.  Septic shock with need for vasopressors, MIKAEL  -continue fluids  -treat underlying cause (c diff)  -f/u cultures

## 2023-04-09 NOTE — ASSESSMENT & PLAN NOTE
- in setting of c diff colitis and prerenal MIKAEL  - improving   - started on sodium bicarb in ICU, will discontinue now that co2 improving and ag resolved)

## 2023-04-09 NOTE — ASSESSMENT & PLAN NOTE
Septic shock 2/2 c diff. CT A/P with colitis on admission and started on PO vanc. C diff collected after ICU transfer and returned + so IV vanc/zosyn discontinued. C diff antigen positive, toxin negative, PCR positive  - continue Vancomycin PO likely course 10-14 days  - blood cultures NGTD X 2  - continue mIVF  - CLD, advance as tolerated  - C diff precautions  -abd xrayed ordered today given significant pain and personally interpreted - no free air, no megacolon

## 2023-04-09 NOTE — PROGRESS NOTES
[facility] Medicine  Progress Note    Patient Name: Cristel Zelaya  MRN: [id number]  Patient Class: IP- Inpatient   Admission Date: 4/5/2023  Length of Stay: 4 days  Attending Physician: Daylin Valdez MD  Primary Care Provider: Primary Doctor No        Subjective:     Principal Problem:Septic shock        HPI:  Cristel Zelaya is a 63 year old female with a PMH of HTN, WILMA, CKD, and recent L4-5 posterior decompression and fusion on 3/20 who presented to the ED with abdominal pain, nausea, diarrhea, and weakness. Daughter provided most history on exam. Patient underwent a L4-5 posterior decompression and fusion on 3/20 for chronic LBP TX. She subsequently was hospitalized from 3/20-3/27, followed by inpatient rehab from 3/27-3/31. Per daughter, there were several other patients at the facility who developed diarrhea and had to be admitted.After discharge, she has been staying with her daughter in [place]. She reports that she had had diarrhea over the past few days with 6-7 loose bowel movements/day. No blood reported in her stool. She has also had nausea without vomiting. This morning she developed epigastric abdominal pain which she describes as 8/10, and squeezing. Daughter reports that she has been drinking pedialyte, but over the past day has had decreased po intake, last intake 5:30PM with increased weakness and fatigue. Daughter grew concerned when she became less responsive and seemed more dehydrated with cracked lips at home. No fevers/chills, URI symptoms, urinary symptoms reported.      On arrival, patient with leukocytosis of 13.11, LA increased to 2.4. Blood gas with pH 7.345, pCO2 31.9, pO2 28, HCO3 17.4.  Hyponatremic to 123, hypochloremic to 90, hyperphosphatemia to 6.1. Decreased bicarb of 16. Cr/BUN increased to 7.0/117, baseline Cr ~1.  In the ED, she was given 2 L IV fluid and started on zosyn, metronizole, and po vancomycin given C. Diff concerns given  history. Given persistent hypotension 90s/50s despite fluid resuscitation, patient started on levophed. Patient admitted to the MICU for shock requiring pressor support.    ICU course:     CT A/P concerning with colitis. C diff and stool studies ordered, C diff not collected while in ICU. MIKAEL and electrolyte abnormalities slowly improving with IVF. MRI lumbar spine without concern for infection. Patient able to be weaned off pressors and stepped down to medicine on 4/7.      Overview/Hospital Course:  Mrs. Zelaya was stepped down to hospital medicine for continued treatment of colitis and electrolyte abnormalities.  She continues to have diarrhea and significant abdominal pain.      Interval History: continues to have significant abdominal pain  and diarrhea. No dyspnea, nausea, chest pain. Several questions about what caused her to be ill. Asks me to speak to her daughter      Objective:     Vital Signs (Most Recent):  Temp: 98.1 °F (36.7 °C) (04/09/23 0814)  Pulse: 105 (04/09/23 1030)  Resp: 18 (04/09/23 1028)  BP: 137/71 (04/09/23 0814)  SpO2: 98 % (04/09/23 0814) Vital Signs (24h Range):  Temp:  [98.1 °F (36.7 °C)-98.5 °F (36.9 °C)] 98.1 °F (36.7 °C)  Pulse:  [] 105  Resp:  [18] 18  SpO2:  [90 %-99 %] 98 %  BP: (113-144)/(59-71) 137/71     Weight: 71.2 kg (156 lb 14.4 oz)  Body mass index is 26.93 kg/m².    Intake/Output Summary (Last 24 hours) at 4/9/2023 1209  Last data filed at 4/8/2023 2150  Gross per 24 hour   Intake 100 ml   Output 505 ml   Net -405 ml      Physical Exam  Vitals and nursing note reviewed.   Constitutional:       Appearance: She is normal weight. She is ill-appearing.   HENT:      Nose: Nose normal. No congestion.      Mouth/Throat:      Mouth: Mucous membranes are dry.      Pharynx: Oropharynx is clear.   Eyes:      Extraocular Movements: Extraocular movements intact.      Conjunctiva/sclera: Conjunctivae normal.   Cardiovascular:      Rate and Rhythm: Normal rate and regular  rhythm.      Heart sounds: Normal heart sounds. No murmur heard.  Pulmonary:      Effort: Pulmonary effort is normal.      Breath sounds: Normal breath sounds. No wheezing, rhonchi or rales.   Chest:      Chest wall: No tenderness.   Abdominal:      General: Abdomen is flat. Bowel sounds are increased.      Palpations: Abdomen is soft.      Tenderness: There is abdominal tenderness. There is no right CVA tenderness, left CVA tenderness, guarding or rebound.      Comments: Generalized tenderness, no rebound, good bowel sounds   Skin:     General: Skin is warm and dry.   Neurological:      General: No focal deficit present.      Mental Status: She is alert.      Comments: Slow to answer and ask questions       Significant Labs: All pertinent labs within the past 24 hours have been reviewed.  CBC:   Recent Labs   Lab 04/08/23  0601 04/09/23  0809   WBC 6.33 5.44   HGB 9.3* 10.2*   HCT 28.6* 32.4*    274     CMP:   Recent Labs   Lab 04/08/23  0601 04/09/23  0524   * 136   K 3.3* 4.0    106   CO2 21* 16*   * 91   BUN 78* 56*   CREATININE 2.1* 1.7*   CALCIUM 8.0* 8.3*   PROT 5.3* 6.1   ALBUMIN 1.6* 1.8*   BILITOT 0.5 0.5   ALKPHOS 115 182*   AST 43* 76*   ALT 29 40   ANIONGAP 11 14       Significant Imaging: I have reviewed all pertinent imaging results/findings within the past 24 hours.      Assessment/Plan:      * Septic shock  2/2 c diff colitis  Admitted from rehab center w/ abdominal pain, diarrhea and confusion. Hypotensive in the ED. Labs w/ mild leukocytosis, anemia, Na 123, HCO3 16, , Cr 7.0, phos 6.1. Received IVFs in the ED along w/ BSabx. LA 2.4 -->2.2. Admitted to MICU for shock requiring vasopressors. Patient started on IV vanc/zosyn for concern for coliitis, as well as PO vanc for presumed C diff. Able to wean off pressors and patient stepped down 4/7.  Septic shock with need for vasopressors, MIKAEL  -continue fluids  -treat underlying cause (c diff)  -f/u cultures    C.  difficile colitis  Septic shock 2/2 c diff. CT A/P with colitis on admission and started on PO vanc. C diff collected after ICU transfer and returned + so IV vanc/zosyn discontinued. C diff antigen positive, toxin negative, PCR positive  - continue Vancomycin PO likely course 10-14 days  - blood cultures NGTD X 2  - continue mIVF  - CLD, advance as tolerated  - C diff precautions  -abd xrayed ordered today given significant pain and personally interpreted - no free air, no megacolon    MIKAEL (acute kidney injury)  Patient with acute kidney injury likely due to IVVD/dehydration and acute tubular necrosis MIKAEL is currently improving. Labs reviewed- Renal function/electrolytes with Estimated Creatinine Clearance: 26.5 mL/min (A) (based on SCr of 2.1 mg/dL (H)). according to latest data. Monitor urine output and serial BMP and adjust therapy as needed. Avoid nephrotoxins and renally dose meds for GFR listed above.     - Baseline Cr 1.0, Cr 7.0 on admission --> now 2.1  - continue with mIVF given continued diarrhea  - avoid nephrotoxic agents      Hypokalemia  Due to continued diarrhea and possible post ATN diuresis  - replacing PO    S/P lumbar fusion  S/p L4-5 posterior decompression and fusion on 3/20 and admitted to rehab 3/27-3/31  - MRI unremarkable for infection source   - pain control  - PT/OT  - will likely need to return to rehab    Hypoalbuminemia  - boost TIDWM      Hyperphosphatemia  Resolved  - Phos 6.1 on admission in the setting of MIKAEL, now resolved with improvement in MIKAEL    Increased anion gap metabolic acidosis  - in setting of c diff colitis and prerenal MIKAEL  - improving   - started on sodium bicarb in ICU, will discontinue now that co2 improving and ag resolved)    Hyponatremia  - Na 123 on admit, currently 135  - in the setting of hypovolemia given ongoing diarrhea  - improvement w/ volume resuscitation    Anemia  - Hgb 9.3, 11.1 on 2/6 as per care everywhere records  - stool occult blood positive, but  no melena, hematochezia--> suspect 2/2 to c diff infection  - monitor for signs of bleeding, daily labs  - if any evidence of bleeding, consider GI consult    Generalized abdominal pain  - due to C diff, monitor for improvement      VTE Risk Mitigation (From admission, onward)         Ordered     heparin (porcine) injection 5,000 Units  Every 8 hours         04/06/23 1335     Place sequential compression device  Until discontinued         04/05/23 2014     IP VTE LOW RISK PATIENT  Once         04/05/23 2014                Discharge Planning   DAISY: 4/14/2023     Code Status: Full Code   Is the patient medically ready for discharge?: No    Reason for patient still in hospital (select all that apply): Treatment  Discharge Plan A: Home Health, Home with family                  Daylin Valdez MD  Department of Hospital Medicine   [facility] - Telemetry Stepdown

## 2023-04-09 NOTE — PLAN OF CARE
Problem: Occupational Therapy  Goal: Occupational Therapy Goal  Description: Goals to be met by: 4/23/23     Patient will increase functional independence with ADLs by performing:    UE Dressing with Modified [place].  LE Dressing with Stand-by Assistance.  Grooming while seated with Modified [place].  Toileting from bedside commode with Contact Guard Assistance for hygiene and clothing management.   Toilet transfer to bedside commode with Contact Guard Assistance.  Upper extremity exercise program 10 reps per handout, with supervision.    Outcome: Ongoing, Progressing     Evaluated pt and established OT POC. Continue OT as tolerated.    Leanne Baxter, OTR/L

## 2023-04-09 NOTE — PLAN OF CARE
Patient AAOX4, RA, vitals are WNL. Pt has had complaints of abdominal pain/cramping, PRNs administered and MD aware. Pt had approximately 6-7 BM today. Pt voiding and incontinent. Pt states no further needs or concerns att. Will continue to monitor att.

## 2023-04-09 NOTE — ASSESSMENT & PLAN NOTE
Patient with acute kidney injury likely due to IVVD/dehydration and acute tubular necrosis MIKAEL is currently improving. Labs reviewed- Renal function/electrolytes with Estimated Creatinine Clearance: 26.5 mL/min (A) (based on SCr of 2.1 mg/dL (H)). according to latest data. Monitor urine output and serial BMP and adjust therapy as needed. Avoid nephrotoxins and renally dose meds for GFR listed above.     - Baseline Cr 1.0, Cr 7.0 on admission --> now 2.1  - continue with mIVF given continued diarrhea  - avoid nephrotoxic agents

## 2023-04-09 NOTE — PT/OT/SLP EVAL
"Occupational Therapy   Evaluation    Name: Cristel Zelaya  MRN: [id number]  Admitting Diagnosis: Septic shock  Recent Surgery: * No surgery found *      Recommendations:     Discharge Recommendations: nursing facility, skilled  Discharge Equipment Recommendations:  shower chair  Barriers to discharge:       Assessment:     Cristel Zelaya is a 63 y.o. female with a medical diagnosis of Septic shock. Performance deficits affecting function: weakness, impaired endurance, impaired self care skills, impaired functional mobility, gait instability, impaired balance, pain, decreased lower extremity function, impaired cardiopulmonary response to activity.     Patient agreeable to OT evaluation. Patient with fair tolerance to activity, requires increased assistance d/t pain and fatigue. Patient requires increased time for pericare this date. Pt is currently far below her baseline LOF and presents a fall risk at this time, is unsafe to go home without 24/7 supervision. Pt would benefit from continued skilled acute OT services in order to maximize independence and safety with ADLs and functional mobility to ensure safe return to PLOF in the least restrictive environment. OT recommending SNF once pt is medically appropriate for d/c.       Rehab Prognosis: Good; patient would benefit from acute skilled OT services to address these deficits and reach maximum level of function.       Plan:     Patient to be seen 3 x/week to address the above listed problems via self-care/home management, therapeutic activities, therapeutic exercises  Plan of Care Expires: 04/23/23  Plan of Care Reviewed with: patient    Subjective     Chief Complaint: fatigue  Patient/Family Comments/goals: "I'm beat"    Occupational Profile:  Living Environment: Pt lives with daughter in [facility] with 0 BRE (originally living in [place] but moved to [place] to have assistance from daughter). Walk in shower, no shower bench/chair  Previous level of function: " Patient was initially independent prior to having lumbar decompression/fusion in March, received inpt rehab in [place], and after d/c was staying with daughter in [place]. Was initially improving but after sade C.diff had a decline in function and was requiring assistance for ambulation and ADLs.  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  Patient found HOB elevated with peripheral IV, PureWick, telemetry upon OT entry to room.    General Precautions: Standard, special contact, fall  Orthopedic Precautions: N/A  Braces: N/A  Respiratory Status: Room air    Occupational Performance:    Bed Mobility:    Patient completed Rolling/Turning to Left with  stand by assistance  Patient completed Rolling/Turning to Right with stand by assistance  Patient completed Scooting/Bridging with minimum assistance  Patient completed Supine to Sit with moderate assistance  Patient completed Sit to Supine with minimum assistance    Functional Mobility/Transfers:  Patient completed Sit <> Stand Transfer with minimum assistance  with  rolling walker   Functional Mobility: able to take ~ 4 steps to the right to HOB with Min A and using RW    Activities of Daily Living:  Toileting: maximal assistance for pericare, able to assist with rolling in bed for cleanup    Cognitive/Visual Perceptual:  Cognitive/Psychosocial Skills:     -       Oriented to: Person, Place, Time, and Situation   -       Follows Commands/attention:Follows two-step  commands  -       Safety awareness/insight to disability: intact   -       Mood/Affect/Coping skills/emotional control: Cooperative and Anxious    Physical Exam:   Left UE Right UE   UE Edema None noted None noted   UE ROM AROM WFL AROM WFL   UE Strength 4-/5 4-/5    Strength grasp WFL grasp WFL   Sensation LUE INTACT:WFL RUE INTACT: WFL   Fine Motor Coordination:  LUE INTACT: WFL RUE INTACT: WFL   Gross Motor Coordination: LUE INTACT: WFL RUE INTACT:WFL         AMPAC 6 Click ADL:  AMPAC Total Score: 16    Treatment & Education:  Therapist provided facilitation and instruction of proper body mechanics and fall prevention strategies during tasks listed above.  Instructed patient to use call light to have nursing staff assist with needs/transfers.  Discussed OT POC and answered all questions within OT scope of practice.    Patient left HOB elevated with all lines intact, call button in reach, RN notified, and daughter present    GOALS:   Multidisciplinary Problems       Occupational Therapy Goals          Problem: Occupational Therapy    Goal Priority Disciplines Outcome Interventions   Occupational Therapy Goal     OT, PT/OT Ongoing, Progressing    Description: Goals to be met by: 4/23/23     Patient will increase functional independence with ADLs by performing:    UE Dressing with Modified [place].  LE Dressing with Stand-by Assistance.  Grooming while seated with Modified [place].  Toileting from bedside commode with Contact Guard Assistance for hygiene and clothing management.   Toilet transfer to bedside commode with Contact Guard Assistance.  Upper extremity exercise program 10 reps per handout, with supervision.                         History:     History reviewed. No pertinent past medical history.      Past Surgical History:   Procedure Laterality Date    BACK SURGERY      CHOLECYSTECTOMY         Time Tracking:     OT Date of Treatment: 04/09/23  OT Start Time: 1400  OT Stop Time: 1456  OT  Total Time (min): 56 min    Billable Minutes:Evaluation 10  Self Care/Home Management 26  Therapeutic Activity 20    4/9/2023

## 2023-04-10 ENCOUNTER — ANESTHESIA EVENT (OUTPATIENT)
Dept: MEDSURG UNIT | Facility: HOSPITAL | Age: 63
End: 2023-04-10

## 2023-04-10 ENCOUNTER — ANESTHESIA (OUTPATIENT)
Dept: MEDSURG UNIT | Facility: HOSPITAL | Age: 63
End: 2023-04-10

## 2023-04-10 LAB
ALBUMIN SERPL BCP-MCNC: 1.9 G/DL (ref 3.5–5.2)
ALP SERPL-CCNC: 209 U/L (ref 55–135)
ALT SERPL W/O P-5'-P-CCNC: 67 U/L (ref 10–44)
ANION GAP SERPL CALC-SCNC: 11 MMOL/L (ref 8–16)
ANISOCYTOSIS BLD QL SMEAR: SLIGHT
AST SERPL-CCNC: 118 U/L (ref 10–40)
BACTERIA BLD CULT: NORMAL
BACTERIA BLD CULT: NORMAL
BASOPHILS # BLD AUTO: 0.02 K/UL (ref 0–0.2)
BASOPHILS NFR BLD: 0.4 % (ref 0–1.9)
BILIRUB SERPL-MCNC: 0.5 MG/DL (ref 0.1–1)
BUN SERPL-MCNC: 51 MG/DL (ref 8–23)
CALCIUM SERPL-MCNC: 8.1 MG/DL (ref 8.7–10.5)
CHLORIDE SERPL-SCNC: 104 MMOL/L (ref 95–110)
CO2 SERPL-SCNC: 24 MMOL/L (ref 23–29)
CREAT SERPL-MCNC: 1.6 MG/DL (ref 0.5–1.4)
DIFFERENTIAL METHOD: ABNORMAL
ELASTASE 1, FECAL: 49 MCG/G
EOSINOPHIL # BLD AUTO: 0 K/UL (ref 0–0.5)
EOSINOPHIL NFR BLD: 0.7 % (ref 0–8)
ERYTHROCYTE [DISTWIDTH] IN BLOOD BY AUTOMATED COUNT: 14 % (ref 11.5–14.5)
EST. GFR  (NO RACE VARIABLE): 36 ML/MIN/1.73 M^2
FAT STL QL: NORMAL
GLUCOSE SERPL-MCNC: 112 MG/DL (ref 70–110)
HCT VFR BLD AUTO: 29.5 % (ref 37–48.5)
HGB BLD-MCNC: 9.7 G/DL (ref 12–16)
HYPOCHROMIA BLD QL SMEAR: ABNORMAL
IMM GRANULOCYTES # BLD AUTO: 0.22 K/UL (ref 0–0.04)
IMM GRANULOCYTES NFR BLD AUTO: 4.1 % (ref 0–0.5)
LYMPHOCYTES # BLD AUTO: 1.2 K/UL (ref 1–4.8)
LYMPHOCYTES NFR BLD: 21.7 % (ref 18–48)
MAGNESIUM SERPL-MCNC: 1.9 MG/DL (ref 1.6–2.6)
MCH RBC QN AUTO: 25.7 PG (ref 27–31)
MCHC RBC AUTO-ENTMCNC: 32.9 G/DL (ref 32–36)
MCV RBC AUTO: 78 FL (ref 82–98)
MONOCYTES # BLD AUTO: 0.3 K/UL (ref 0.3–1)
MONOCYTES NFR BLD: 5.6 % (ref 4–15)
NEUTRAL FAT STL QL: NORMAL
NEUTROPHILS # BLD AUTO: 3.6 K/UL (ref 1.8–7.7)
NEUTROPHILS NFR BLD: 67.5 % (ref 38–73)
NRBC BLD-RTO: 0 /100 WBC
OVALOCYTES BLD QL SMEAR: ABNORMAL
PHOSPHATE SERPL-MCNC: 3.2 MG/DL (ref 2.7–4.5)
PLATELET # BLD AUTO: 229 K/UL (ref 150–450)
PMV BLD AUTO: 10.3 FL (ref 9.2–12.9)
POIKILOCYTOSIS BLD QL SMEAR: SLIGHT
POLYCHROMASIA BLD QL SMEAR: ABNORMAL
POTASSIUM SERPL-SCNC: 3.2 MMOL/L (ref 3.5–5.1)
PROT SERPL-MCNC: 5.5 G/DL (ref 6–8.4)
RBC # BLD AUTO: 3.78 M/UL (ref 4–5.4)
SCHISTOCYTES BLD QL SMEAR: ABNORMAL
SODIUM SERPL-SCNC: 139 MMOL/L (ref 136–145)
SPHEROCYTES BLD QL SMEAR: ABNORMAL
WBC # BLD AUTO: 5.35 K/UL (ref 3.9–12.7)

## 2023-04-10 PROCEDURE — 97162 PT EVAL MOD COMPLEX 30 MIN: CPT

## 2023-04-10 PROCEDURE — 99233 SBSQ HOSP IP/OBS HIGH 50: CPT | Mod: ,,, | Performed by: INTERNAL MEDICINE

## 2023-04-10 PROCEDURE — 84100 ASSAY OF PHOSPHORUS: CPT | Performed by: NURSE PRACTITIONER

## 2023-04-10 PROCEDURE — 25000003 PHARM REV CODE 250: Performed by: PHYSICIAN ASSISTANT

## 2023-04-10 PROCEDURE — 25000003 PHARM REV CODE 250: Performed by: NURSE PRACTITIONER

## 2023-04-10 PROCEDURE — 27000207 HC ISOLATION

## 2023-04-10 PROCEDURE — 63600175 PHARM REV CODE 636 W HCPCS: Performed by: HOSPITALIST

## 2023-04-10 PROCEDURE — 25000003 PHARM REV CODE 250: Performed by: HOSPITALIST

## 2023-04-10 PROCEDURE — 25000003 PHARM REV CODE 250: Performed by: CLINICAL NURSE SPECIALIST

## 2023-04-10 PROCEDURE — 99233 PR SUBSEQUENT HOSPITAL CARE,LEVL III: ICD-10-PCS | Mod: ,,, | Performed by: INTERNAL MEDICINE

## 2023-04-10 PROCEDURE — 83735 ASSAY OF MAGNESIUM: CPT | Performed by: NURSE PRACTITIONER

## 2023-04-10 PROCEDURE — 20600001 HC STEP DOWN PRIVATE ROOM

## 2023-04-10 PROCEDURE — 97530 THERAPEUTIC ACTIVITIES: CPT

## 2023-04-10 PROCEDURE — 97116 GAIT TRAINING THERAPY: CPT

## 2023-04-10 PROCEDURE — 25000003 PHARM REV CODE 250: Performed by: INTERNAL MEDICINE

## 2023-04-10 PROCEDURE — 63600175 PHARM REV CODE 636 W HCPCS: Performed by: INTERNAL MEDICINE

## 2023-04-10 PROCEDURE — 36415 COLL VENOUS BLD VENIPUNCTURE: CPT | Performed by: NURSE PRACTITIONER

## 2023-04-10 PROCEDURE — 85025 COMPLETE CBC W/AUTO DIFF WBC: CPT | Performed by: NURSE PRACTITIONER

## 2023-04-10 PROCEDURE — 80053 COMPREHEN METABOLIC PANEL: CPT | Performed by: NURSE PRACTITIONER

## 2023-04-10 RX ADMIN — VANCOMYCIN HYDROCHLORIDE 125 MG: KIT at 09:04

## 2023-04-10 RX ADMIN — HYDROMORPHONE HYDROCHLORIDE 0.5 MG: 1 INJECTION, SOLUTION INTRAMUSCULAR; INTRAVENOUS; SUBCUTANEOUS at 06:04

## 2023-04-10 RX ADMIN — LIDOCAINE 1 PATCH: 50 PATCH CUTANEOUS at 09:04

## 2023-04-10 RX ADMIN — ACETAMINOPHEN 1000 MG: 500 TABLET ORAL at 02:04

## 2023-04-10 RX ADMIN — VANCOMYCIN HYDROCHLORIDE 125 MG: KIT at 03:04

## 2023-04-10 RX ADMIN — HEPARIN SODIUM 5000 UNITS: 5000 INJECTION INTRAVENOUS; SUBCUTANEOUS at 03:04

## 2023-04-10 RX ADMIN — HYDROMORPHONE HYDROCHLORIDE 0.5 MG: 1 INJECTION, SOLUTION INTRAMUSCULAR; INTRAVENOUS; SUBCUTANEOUS at 11:04

## 2023-04-10 RX ADMIN — OXYCODONE HYDROCHLORIDE 5 MG: 5 TABLET ORAL at 03:04

## 2023-04-10 RX ADMIN — HEPARIN SODIUM 5000 UNITS: 5000 INJECTION INTRAVENOUS; SUBCUTANEOUS at 06:04

## 2023-04-10 RX ADMIN — POTASSIUM BICARBONATE 40 MEQ: 391 TABLET, EFFERVESCENT ORAL at 03:04

## 2023-04-10 RX ADMIN — OXYCODONE HYDROCHLORIDE 5 MG: 5 TABLET ORAL at 09:04

## 2023-04-10 RX ADMIN — ACETAMINOPHEN 1000 MG: 500 TABLET ORAL at 09:04

## 2023-04-10 RX ADMIN — DORZOLAMIDE HYDROCHLORIDE 1 DROP: 20 SOLUTION OPHTHALMIC at 09:04

## 2023-04-10 RX ADMIN — HYDROMORPHONE HYDROCHLORIDE 0.5 MG: 1 INJECTION, SOLUTION INTRAMUSCULAR; INTRAVENOUS; SUBCUTANEOUS at 03:04

## 2023-04-10 RX ADMIN — HEPARIN SODIUM 5000 UNITS: 5000 INJECTION INTRAVENOUS; SUBCUTANEOUS at 09:04

## 2023-04-10 RX ADMIN — HYDROMORPHONE HYDROCHLORIDE 0.5 MG: 1 INJECTION, SOLUTION INTRAMUSCULAR; INTRAVENOUS; SUBCUTANEOUS at 09:04

## 2023-04-10 RX ADMIN — SODIUM BICARBONATE 650 MG: 650 TABLET ORAL at 09:04

## 2023-04-10 RX ADMIN — LATANOPROST 1 DROP: 50 SOLUTION OPHTHALMIC at 09:04

## 2023-04-10 RX ADMIN — Medication 1 CAPSULE: at 09:04

## 2023-04-10 NOTE — CONSULTS
Inpatient consult to Physical Medicine Rehab  Consult performed by: Teresa Lomeli NP  Consult ordered by: Padmaja Clay MD  Reason for consult: Assess rehab needs    Reviewed patient history and current admission.  Rehab team following.  Full consult to follow.    GWEN Ellis, FNP-C  Physical Medicine & Rehabilitation   04/10/2023

## 2023-04-10 NOTE — NURSING
Pt in bed . Eyes closed. Chest rise and fall visible. VSS and WNL. No signs of distress. Pt allowed to sleep

## 2023-04-10 NOTE — PLAN OF CARE
Problem: Infection  Goal: Absence of Infection Signs and Symptoms  Outcome: Ongoing, Progressing     Problem: Adult Inpatient Plan of Care  Goal: Plan of Care Review  Outcome: Ongoing, Progressing  Goal: Patient-Specific Goal (Individualized)  Outcome: Ongoing, Progressing  Goal: Absence of Hospital-Acquired Illness or Injury  Outcome: Ongoing, Progressing  Goal: Optimal Comfort and Wellbeing  Outcome: Ongoing, Progressing  Goal: Readiness for Transition of Care  Outcome: Ongoing, Progressing     Problem: Fluid and Electrolyte Imbalance (Acute Kidney Injury/Impairment)  Goal: Fluid and Electrolyte Balance  Outcome: Ongoing, Progressing     Problem: Oral Intake Inadequate (Acute Kidney Injury/Impairment)  Goal: Optimal Nutrition Intake  Outcome: Ongoing, Progressing     Problem: Renal Function Impairment (Acute Kidney Injury/Impairment)  Goal: Effective Renal Function  Outcome: Ongoing, Progressing     Problem: Skin Injury Risk Increased  Goal: Skin Health and Integrity  Outcome: Ongoing, Progressing     Problem: Fall Injury Risk  Goal: Absence of Fall and Fall-Related Injury  Outcome: Ongoing, Progressing     Problem: Adjustment to Illness (Sepsis/Septic Shock)  Goal: Optimal Coping  Outcome: Ongoing, Progressing     Problem: Bleeding (Sepsis/Septic Shock)  Goal: Absence of Bleeding  Outcome: Ongoing, Progressing     Problem: Glycemic Control Impaired (Sepsis/Septic Shock)  Goal: Blood Glucose Level Within Desired Range  Outcome: Ongoing, Progressing     Problem: Infection Progression (Sepsis/Septic Shock)  Goal: Absence of Infection Signs and Symptoms  Outcome: Ongoing, Progressing     Problem: Nutrition Impaired (Sepsis/Septic Shock)  Goal: Optimal Nutrition Intake  Outcome: Ongoing, Progressing

## 2023-04-10 NOTE — PT/OT/SLP EVAL
Physical Therapy  Evaluation and Treatment    Patient Name:  Cristel Zelaya   MRN:  [id number]    Recent Surgery: * No surgery found *      Recommendations:     Discharge Recommendations:  nursing facility, skilled   Discharge Equipment Recommendations: shower chair   Barriers to discharge: None    Highest Level of Mobility: Gait 35'  Assistance Required: Min(a) w/ RW    Assessment:     Cristel Zelaya is a 63 y.o. female admitted with a medical diagnosis of Septic shock. She presents with the following impairments/functional limitations:  weakness, gait instability, decreased upper extremity function, impaired cardiopulmonary response to activity, impaired endurance, impaired balance, decreased lower extremity function, decreased safety awareness, impaired self care skills, impaired functional mobility, pain    Pt met with HOB elevated and agreeable to PT session. Pt reports prior to her lumbar fusion, her PLOF was (I) with functional mobility and ADLs using no DME. Pt reports suffering a recent decline and recently has required assistance with ADLs from her daughter. Currently, pt is globally deconditioned and requires min(A) to ambulate with RW. Pt limited by abdominal pain and did have a BM during session. Pt reports extreme fatigue after ambulation.     Pt would benefit from continued skilled acute PT 3x/wk to address above listed functional deficits, provide patient/caregiver education, reduce fall risk, and maximize (I) and safety with functional mobility. After hospital discharge, pt would benefit from SNF to maximize rehab potential.    Rehab Prognosis: Good; patient would benefit from acute skilled PT services to address these deficits and reach maximum level of function.      Plan:     During this hospitalization, patient to be seen 3 x/week to address the identified rehab impairments via gait training, therapeutic activities, therapeutic exercises and progress toward the following goals:    Plan of  "Care Expires:  05/10/23    This plan of care has been discussed with the patient/caregiver, who was included in its development and is in agreement with the identified goals and treatment plan.     Subjective     Communicated with RN prior to session.  Patient agreeable to participate.     Chief Complaint: Septic shock  Patient/Family Comments/goals: "I'm worn out. Thanks for helping me"    Pain/Comfort:  Pain Rating 1:  (unrated)  Location - Orientation 1: generalized  Location 1: abdomen  Pain Addressed 1: Reposition, Distraction  Pain Rating Post-Intervention 1:  (unrated)    Patients cultural, spiritual, Buddhism conflicts given the current situation: no    Patient's living environment is as follows:  Living Environment: Pt lives with daughter in [facility] with 1 BRE.   Prior Level of Function: Pt reports prior to her lumbar fusion, her PLOF was (I) with functional mobility and ADLs using no DME. Pt reports suffering a recent decline and recently has required assistance with ADLs from her daughter.  DME used: walker, rolling, bedside commode  DME owned (not currently used): none  Upon discharge, patient will have assistance from: Daughter, however she works during the day    Objective:     Patient found HOB elevated with peripheral IV, telemetry  upon PT entry to room.    General Precautions: Standard, special contact, fall   Orthopedic Precautions:N/A   Braces: N/A   /79 (BP Location: Right arm, Patient Position: Lying)   Pulse 108   Temp 98.5 °F (36.9 °C) (Oral)   Resp 18   Ht 5' 4" (1.626 m)   Wt 70.2 kg (154 lb 11.2 oz)   SpO2 96%   Breastfeeding No   BMI 26.55 kg/m²   Oxygen Device:  room air      Exams:    Cognition:  Patient is oriented to Person, Place, Time, Situation  Follows two-step commands  Insight to deficits/safety awareness: impaired    Edema: None present    Postural examination/scapula alignment: Rounded shoulder and Head forward    Lower Extremity Range of Motion:  Right Lower " Extremity: WNL  Left Lower Extremity: WNL    Lower Extremity Strength    Right LE  Left LE    Hip Flexion: 4-/5 Hip Flexion: 4-/5   Knee Extension: 4/5 Knee Extension: 4/5   Knee Flexion: 4/5 Knee Flexion: 4/5   Ankle Dorsiflexion:  4/5 Ankle Dorsiflexion: 4/5   Ankle Plantarflexion: 4/5 Ankle Plantarflexion: 4/5        Sensation:   Light touch sensation: Intact BLEs    Functional Mobility:    Bed Mobility:  Supine to Sit: Minimal Assistance on R side of bed  Sit to Supine: Minimal Assistance  Scooting anteriorly to EOB to plant feet on floor: Minimal Assistance    Transfers:   Sit to Stand Transfer: Minimal Assistance  from EOB with RW AD   Toilet t/f: Minimal Assistance with RW AD   Pt had a BM, required assist with pericare in standing             Gait:  Patient received gait training in room 35 feet (multiple turns) with Minimal Assistance and rolling walker  Gait Assessment: occasional unsteady gait, decreased step length, narrow base of support, flexed posture, and decreased isaías  Gait Pattern Observed: Step-through reciprocal   Comments: All lines remained intact throughout ambulation trial, gait belt utilized    Balance:  Static Sit:   Stand-By Assist at EOB \  Normal: Patient able to maintain steady balance without handhold support.  Static Stand:   Stand-By Assist with Rolling walker    Dynamic Stand:  Min Assist with Rolling walker          Therapeutic Activities/Exercises     Patient assisted with functional mobility as noted above  Discussed at length benefits of PT as well as d/c recommendations. Pt agreeable  Patient educated on the importance of early mobility, OOB to prevent functional decline during hospital stay  Patient was instructed to utilize staff assistance for mobility/transfers.  Patient is appropriate to transfer with min(A) and RN/PCT assist  Patient educated on PT POC and role of PT in acute care  White board updated to include patient's safest level of mobility with staff  assistance, RN also updated    AM-PAC 6 CLICK MOBILITY  Turning over in bed (including adjusting bedclothes, sheets and blankets)?: 4  Sitting down on and standing up from a chair with arms (e.g., wheelchair, bedside commode, etc.): 3  Moving from lying on back to sitting on the side of the bed?: 3  Moving to and from a bed to a chair (including a wheelchair)?: 3  Need to walk in hospital room?: 3  Climbing 3-5 steps with a railing?: 2  Basic Mobility Total Score: 18      Patient left HOB elevated with all lines intact, call button in reach, bed alarm on, and rn notified.      History/Goals:     PAST MEDICAL HISTORY:  History reviewed. No pertinent past medical history.    Past Surgical History:   Procedure Laterality Date    BACK SURGERY      CHOLECYSTECTOMY         GOALS:   Multidisciplinary Problems       Physical Therapy Goals          Problem: Physical Therapy    Goal Priority Disciplines Outcome Goal Variances Interventions   Physical Therapy Goal     PT, PT/OT Ongoing, Progressing     Description: Goals to be met by: 23     Patient will increase functional independence with mobility by performin. Supine to sit with Stand-by Assistance  2. Sit to supine with Stand-by Assistance  3. Sit to stand transfer with Stand-by Assistance  4. Bed to chair transfer with Stand-by Assistance using LRAD as needed  5. Gait  x 100 feet with Stand-by Assistance using LRAD as needed.   6. Lower extremity exercise program x15 reps per handout, with assistance as needed                         Time Tracking:     PT Received On: 04/10/23  PT Start Time: 1446     PT Stop Time: 1519  PT Total Time (min): 33 min     Billable Minutes: Evaluation 10, Gait Training 15, and Therapeutic Activity 8      Shannon Matias, PT  04/10/2023  Pager# [phone]

## 2023-04-10 NOTE — PLAN OF CARE
POC established and functional mobility goals were created to help pt return to PLOF. Will be reassessed as appropriate to measure pt progress.    Problem: Physical Therapy  Goal: Physical Therapy Goal  Description: Goals to be met by: 23     Patient will increase functional independence with mobility by performin. Supine to sit with Stand-by Assistance  2. Sit to supine with Stand-by Assistance  3. Sit to stand transfer with Stand-by Assistance  4. Bed to chair transfer with Stand-by Assistance using LRAD as needed  5. Gait  x 100 feet with Stand-by Assistance using LRAD as needed.   6. Lower extremity exercise program x15 reps per handout, with assistance as needed    Outcome: Ongoing, Progressing

## 2023-04-10 NOTE — PLAN OF CARE
04/10/23 1018   Post-Acute Status   Post-Acute Authorization Placement   Post-Acute Placement Status Referrals Sent  ([facility])     SW reviewed chart for dc planning needs. Per previous CM, Pt daughter requesting [facility] or [facility] if Pt needs inPt therapy, and HH otherwise. Pt daughter works per notes but assists her mother at baseline. Sent to [facility], and [facility] for review.     Shahrzad Phoenix LCSW  Neurocritical Care   [facility]  [id number]

## 2023-04-11 LAB
ALBUMIN SERPL BCP-MCNC: 2.1 G/DL (ref 3.5–5.2)
ALP SERPL-CCNC: 182 U/L (ref 55–135)
ALT SERPL W/O P-5'-P-CCNC: 76 U/L (ref 10–44)
ANION GAP SERPL CALC-SCNC: 10 MMOL/L (ref 8–16)
AST SERPL-CCNC: 116 U/L (ref 10–40)
BASOPHILS # BLD AUTO: 0.02 K/UL (ref 0–0.2)
BASOPHILS NFR BLD: 0.5 % (ref 0–1.9)
BILIRUB SERPL-MCNC: 0.5 MG/DL (ref 0.1–1)
BUN SERPL-MCNC: 37 MG/DL (ref 8–23)
CALCIUM SERPL-MCNC: 8.1 MG/DL (ref 8.7–10.5)
CHLORIDE SERPL-SCNC: 106 MMOL/L (ref 95–110)
CO2 SERPL-SCNC: 22 MMOL/L (ref 23–29)
CREAT SERPL-MCNC: 1.2 MG/DL (ref 0.5–1.4)
DIFFERENTIAL METHOD: ABNORMAL
EOSINOPHIL # BLD AUTO: 0 K/UL (ref 0–0.5)
EOSINOPHIL NFR BLD: 0.7 % (ref 0–8)
ERYTHROCYTE [DISTWIDTH] IN BLOOD BY AUTOMATED COUNT: 14.4 % (ref 11.5–14.5)
EST. GFR  (NO RACE VARIABLE): 50.9 ML/MIN/1.73 M^2
GLUCOSE SERPL-MCNC: 94 MG/DL (ref 70–110)
HCT VFR BLD AUTO: 33.9 % (ref 37–48.5)
HGB BLD-MCNC: 10.5 G/DL (ref 12–16)
IMM GRANULOCYTES # BLD AUTO: 0.08 K/UL (ref 0–0.04)
IMM GRANULOCYTES NFR BLD AUTO: 1.8 % (ref 0–0.5)
LYMPHOCYTES # BLD AUTO: 1.4 K/UL (ref 1–4.8)
LYMPHOCYTES NFR BLD: 32.3 % (ref 18–48)
MAGNESIUM SERPL-MCNC: 1.8 MG/DL (ref 1.6–2.6)
MCH RBC QN AUTO: 25.5 PG (ref 27–31)
MCHC RBC AUTO-ENTMCNC: 31 G/DL (ref 32–36)
MCV RBC AUTO: 83 FL (ref 82–98)
MONOCYTES # BLD AUTO: 0.3 K/UL (ref 0.3–1)
MONOCYTES NFR BLD: 7.1 % (ref 4–15)
NEUTROPHILS # BLD AUTO: 2.5 K/UL (ref 1.8–7.7)
NEUTROPHILS NFR BLD: 57.6 % (ref 38–73)
NRBC BLD-RTO: 0 /100 WBC
PHOSPHATE SERPL-MCNC: 2.6 MG/DL (ref 2.7–4.5)
PLATELET # BLD AUTO: 226 K/UL (ref 150–450)
PMV BLD AUTO: 10.9 FL (ref 9.2–12.9)
POTASSIUM SERPL-SCNC: 3.7 MMOL/L (ref 3.5–5.1)
PROT SERPL-MCNC: 5.8 G/DL (ref 6–8.4)
RBC # BLD AUTO: 4.11 M/UL (ref 4–5.4)
SODIUM SERPL-SCNC: 138 MMOL/L (ref 136–145)
WBC # BLD AUTO: 4.36 K/UL (ref 3.9–12.7)

## 2023-04-11 PROCEDURE — 25000003 PHARM REV CODE 250: Performed by: NURSE PRACTITIONER

## 2023-04-11 PROCEDURE — 80053 COMPREHEN METABOLIC PANEL: CPT | Performed by: NURSE PRACTITIONER

## 2023-04-11 PROCEDURE — 36415 COLL VENOUS BLD VENIPUNCTURE: CPT | Performed by: NURSE PRACTITIONER

## 2023-04-11 PROCEDURE — 25000003 PHARM REV CODE 250: Performed by: CLINICAL NURSE SPECIALIST

## 2023-04-11 PROCEDURE — 27000207 HC ISOLATION

## 2023-04-11 PROCEDURE — 25000003 PHARM REV CODE 250: Performed by: INTERNAL MEDICINE

## 2023-04-11 PROCEDURE — 63600175 PHARM REV CODE 636 W HCPCS: Performed by: HOSPITALIST

## 2023-04-11 PROCEDURE — 99233 PR SUBSEQUENT HOSPITAL CARE,LEVL III: ICD-10-PCS | Mod: ,,, | Performed by: INTERNAL MEDICINE

## 2023-04-11 PROCEDURE — 85025 COMPLETE CBC W/AUTO DIFF WBC: CPT | Performed by: NURSE PRACTITIONER

## 2023-04-11 PROCEDURE — 84100 ASSAY OF PHOSPHORUS: CPT | Performed by: NURSE PRACTITIONER

## 2023-04-11 PROCEDURE — 63600175 PHARM REV CODE 636 W HCPCS: Performed by: NURSE PRACTITIONER

## 2023-04-11 PROCEDURE — 25000003 PHARM REV CODE 250: Performed by: STUDENT IN AN ORGANIZED HEALTH CARE EDUCATION/TRAINING PROGRAM

## 2023-04-11 PROCEDURE — 99233 SBSQ HOSP IP/OBS HIGH 50: CPT | Mod: ,,, | Performed by: INTERNAL MEDICINE

## 2023-04-11 PROCEDURE — 25000003 PHARM REV CODE 250: Performed by: PHYSICIAN ASSISTANT

## 2023-04-11 PROCEDURE — 20600001 HC STEP DOWN PRIVATE ROOM

## 2023-04-11 PROCEDURE — 83735 ASSAY OF MAGNESIUM: CPT | Performed by: NURSE PRACTITIONER

## 2023-04-11 PROCEDURE — 63600175 PHARM REV CODE 636 W HCPCS: Performed by: INTERNAL MEDICINE

## 2023-04-11 RX ORDER — OXYCODONE HYDROCHLORIDE 5 MG/1
5 TABLET ORAL ONCE
Status: COMPLETED | OUTPATIENT
Start: 2023-04-11 | End: 2023-04-11

## 2023-04-11 RX ADMIN — HEPARIN SODIUM 5000 UNITS: 5000 INJECTION INTRAVENOUS; SUBCUTANEOUS at 03:04

## 2023-04-11 RX ADMIN — LATANOPROST 1 DROP: 50 SOLUTION OPHTHALMIC at 08:04

## 2023-04-11 RX ADMIN — HEPARIN SODIUM 5000 UNITS: 5000 INJECTION INTRAVENOUS; SUBCUTANEOUS at 06:04

## 2023-04-11 RX ADMIN — OXYCODONE HYDROCHLORIDE 5 MG: 5 TABLET ORAL at 08:04

## 2023-04-11 RX ADMIN — HYDROMORPHONE HYDROCHLORIDE 0.5 MG: 1 INJECTION, SOLUTION INTRAMUSCULAR; INTRAVENOUS; SUBCUTANEOUS at 08:04

## 2023-04-11 RX ADMIN — OXYCODONE HYDROCHLORIDE 5 MG: 5 TABLET ORAL at 04:04

## 2023-04-11 RX ADMIN — LIDOCAINE 1 PATCH: 50 PATCH CUTANEOUS at 08:04

## 2023-04-11 RX ADMIN — SODIUM CHLORIDE, POTASSIUM CHLORIDE, SODIUM LACTATE AND CALCIUM CHLORIDE: 600; 310; 30; 20 INJECTION, SOLUTION INTRAVENOUS at 08:04

## 2023-04-11 RX ADMIN — DORZOLAMIDE HYDROCHLORIDE 1 DROP: 20 SOLUTION OPHTHALMIC at 08:04

## 2023-04-11 RX ADMIN — VANCOMYCIN HYDROCHLORIDE 125 MG: KIT at 08:04

## 2023-04-11 RX ADMIN — VANCOMYCIN HYDROCHLORIDE 125 MG: KIT at 03:04

## 2023-04-11 RX ADMIN — POTASSIUM BICARBONATE 40 MEQ: 391 TABLET, EFFERVESCENT ORAL at 09:04

## 2023-04-11 RX ADMIN — OXYCODONE HYDROCHLORIDE 5 MG: 5 TABLET ORAL at 03:04

## 2023-04-11 RX ADMIN — HYDROMORPHONE HYDROCHLORIDE 0.5 MG: 1 INJECTION, SOLUTION INTRAMUSCULAR; INTRAVENOUS; SUBCUTANEOUS at 05:04

## 2023-04-11 RX ADMIN — HYDROMORPHONE HYDROCHLORIDE 0.5 MG: 1 INJECTION, SOLUTION INTRAMUSCULAR; INTRAVENOUS; SUBCUTANEOUS at 12:04

## 2023-04-11 RX ADMIN — Medication 1 CAPSULE: at 08:04

## 2023-04-11 RX ADMIN — HEPARIN SODIUM 5000 UNITS: 5000 INJECTION INTRAVENOUS; SUBCUTANEOUS at 09:04

## 2023-04-11 RX ADMIN — VANCOMYCIN HYDROCHLORIDE 125 MG: KIT at 02:04

## 2023-04-11 RX ADMIN — HYDROMORPHONE HYDROCHLORIDE 0.5 MG: 1 INJECTION, SOLUTION INTRAMUSCULAR; INTRAVENOUS; SUBCUTANEOUS at 02:04

## 2023-04-11 NOTE — PHYSICIAN QUERY
PT Name: Cristel Zelaya  MR #: [id number]    DOCUMENTATION CLARIFICATION     CDS/: Sharon Alegre RN          Contact Information: [email or web address]    This form is a permanent document in the medical record.     Query Date: April 11, 2023    By submitting this query, we are merely seeking further clarification of documentation. Please utilize your independent clinical judgment when addressing the question(s) below.    The Medical Record contains the following:   Indicators   Supporting Clinical Findings Location in Medical Record     x AMS, Confusion,  LOC, etc.  Azotemia with mild encephalopathy 4/6/2023 H&P     x Acute/Chronic Illness Shock mostly hypovolemic, poss septic in setting of diarrheal illness, diarrhea, MIKAEL on CKD: likely ATN; HAGMA with associated NAGMA, Hyperphosphatemia, Hyponatremia  4/6/2023 H&P    Radiology Findings       x Electrolyte Imbalance  04/05/23 18:19 04/06/23 04:37 04/07/23 04:50   Sodium 123 (L) 125 (L) 131 (L)   Potassium 4.2 3.7 3.5   Chloride 90 (L) 94 (L) 100   CO2 16 (L) 15 (L) 16 (L)   Anion Gap 17 (H) 16 15    (H) 110 (H) 90 (H)   Creatinine 7.0 (H) 5.1 (H) 3.1 (H)   Calcium 8.2 (L) 8.0 (L) 7.9 (L)   Phosphorus 6.1 (H) 5.6 (H) 4.9 (H)    Lab results     x Medication oral vanc and zosyn   start on Oral bicarb 4/6/2023 H&P     x Treatment         fluid resuscitation 4/6/2023 H&P    Other       The noted clinical guidelines are only system guidelines and do not replace the providers clinical judgment.    The National [place] of Neurologic Disorders and Stroke (NINDS) of the NIH describes encephalopathy as any diffuse disease of the brain that alters brain function or structure.    Provider, please clarify the Encephalopathy diagnosis or diagnoses associated with above clinical findings.    [  x ] Metabolic Encephalopathy - Due to electrolyte imbalance, metabolic derangements, or infectious processes, includes Septic Encephalopathy, Uremic Encephalopathy    [   ] Encephalopathy, unspecified      [   ] Other Encephalopathy (please specify): ____________________   [   ]  Clinically Undetermined       Please document in your progress notes daily for the duration of treatment until resolved, and include in your discharge summary.    References:  ERIKA Davies RN, CCDS. (2018, June 9). Notes from the Instructor: Encephalopathy tips. Retrieved October 22, 2020, from [email or web address]://acdis.[email or web address]/articles/[email or web address]-instructor-encephalopathy-tips    ICD-9-CM Coding Clinic First Quarter 2013, Effective with discharges: October 21, 2013 [facility] § Seizure with encephalopathy due to postictal state (2013).    ICD-10-CM/[facility] [employer] Integrated Codebook (Version V.20.8.10.0) [Computer software]. (2020). Retrieved October 21, 2020.    National [place] of Neurological Disorders and Stroke. (2019, March 27). Retrieved October 22, 2020, from https://www.ninds.nih.gov/Disorders/All-Disorders/Wqpyxmdoesfezy-Ceesamepspz-Eknl    Form No. 52670

## 2023-04-11 NOTE — PT/OT/SLP PROGRESS
Occupational Therapy Missed Treatment      Patient Name:  Cristel Zelaya   MRN:  [id number]    Patient not seen on this date. However, per chart review pt continues to benefit from acute OT services. OT to follow up as POC allows.   Please continue progressive mobility and assistance with functional ADLs as appropriate. Will follow-up as scheduled.    Discharge Disposition Recommendation: [facility]    Deedee Hopkins, MARLEN  4/11/2023

## 2023-04-11 NOTE — PLAN OF CARE
Plan of care reviewed with pt. Pt aaox4. Pt had over 10 BMs today. PO vanc continued. Abd xray done. Pt still on contact for cdiff. Pt diet advanced to a regular diet. Fluids DC'c. Pain to back controlled with dilaudid and oxy. Pt remained free of falls, trauma, and injury. Will continue to monitor.

## 2023-04-12 LAB
ALBUMIN SERPL BCP-MCNC: 2 G/DL (ref 3.5–5.2)
ALP SERPL-CCNC: 135 U/L (ref 55–135)
ALT SERPL W/O P-5'-P-CCNC: 56 U/L (ref 10–44)
ANION GAP SERPL CALC-SCNC: 13 MMOL/L (ref 8–16)
ANISOCYTOSIS BLD QL SMEAR: SLIGHT
AST SERPL-CCNC: 67 U/L (ref 10–40)
BASOPHILS # BLD AUTO: 0.02 K/UL (ref 0–0.2)
BASOPHILS NFR BLD: 0.5 % (ref 0–1.9)
BILIRUB SERPL-MCNC: 0.5 MG/DL (ref 0.1–1)
BUN SERPL-MCNC: 29 MG/DL (ref 8–23)
CALCIUM SERPL-MCNC: 8.2 MG/DL (ref 8.7–10.5)
CALPROTECTIN STL-MCNT: 922.7 MCG/G
CHLORIDE SERPL-SCNC: 108 MMOL/L (ref 95–110)
CO2 SERPL-SCNC: 22 MMOL/L (ref 23–29)
CREAT SERPL-MCNC: 1.4 MG/DL (ref 0.5–1.4)
DIFFERENTIAL METHOD: ABNORMAL
DOHLE BOD BLD QL SMEAR: PRESENT
EOSINOPHIL # BLD AUTO: 0 K/UL (ref 0–0.5)
EOSINOPHIL NFR BLD: 0.8 % (ref 0–8)
ERYTHROCYTE [DISTWIDTH] IN BLOOD BY AUTOMATED COUNT: 14.6 % (ref 11.5–14.5)
EST. GFR  (NO RACE VARIABLE): 42.3 ML/MIN/1.73 M^2
GLUCOSE SERPL-MCNC: 101 MG/DL (ref 70–110)
HCT VFR BLD AUTO: 27.7 % (ref 37–48.5)
HGB BLD-MCNC: 8.7 G/DL (ref 12–16)
IMM GRANULOCYTES # BLD AUTO: 0.05 K/UL (ref 0–0.04)
IMM GRANULOCYTES NFR BLD AUTO: 1.4 % (ref 0–0.5)
LYMPHOCYTES # BLD AUTO: 1.5 K/UL (ref 1–4.8)
LYMPHOCYTES NFR BLD: 41.2 % (ref 18–48)
MAGNESIUM SERPL-MCNC: 1.7 MG/DL (ref 1.6–2.6)
MCH RBC QN AUTO: 25.7 PG (ref 27–31)
MCHC RBC AUTO-ENTMCNC: 31.4 G/DL (ref 32–36)
MCV RBC AUTO: 82 FL (ref 82–98)
MONOCYTES # BLD AUTO: 0.3 K/UL (ref 0.3–1)
MONOCYTES NFR BLD: 9.2 % (ref 4–15)
NEUTROPHILS # BLD AUTO: 1.7 K/UL (ref 1.8–7.7)
NEUTROPHILS NFR BLD: 46.9 % (ref 38–73)
NRBC BLD-RTO: 0 /100 WBC
OVALOCYTES BLD QL SMEAR: ABNORMAL
PHOSPHATE SERPL-MCNC: 2.8 MG/DL (ref 2.7–4.5)
PLATELET # BLD AUTO: 209 K/UL (ref 150–450)
PMV BLD AUTO: 10.6 FL (ref 9.2–12.9)
POIKILOCYTOSIS BLD QL SMEAR: SLIGHT
POLYCHROMASIA BLD QL SMEAR: ABNORMAL
POTASSIUM SERPL-SCNC: 3.8 MMOL/L (ref 3.5–5.1)
PROT SERPL-MCNC: 5.6 G/DL (ref 6–8.4)
RBC # BLD AUTO: 3.39 M/UL (ref 4–5.4)
SCHISTOCYTES BLD QL SMEAR: ABNORMAL
SODIUM SERPL-SCNC: 143 MMOL/L (ref 136–145)
TOXIC GRANULES BLD QL SMEAR: PRESENT
WBC # BLD AUTO: 3.69 K/UL (ref 3.9–12.7)

## 2023-04-12 PROCEDURE — 97535 SELF CARE MNGMENT TRAINING: CPT

## 2023-04-12 PROCEDURE — 25000003 PHARM REV CODE 250: Performed by: PHYSICIAN ASSISTANT

## 2023-04-12 PROCEDURE — 27000207 HC ISOLATION

## 2023-04-12 PROCEDURE — 83735 ASSAY OF MAGNESIUM: CPT | Performed by: NURSE PRACTITIONER

## 2023-04-12 PROCEDURE — 25000003 PHARM REV CODE 250: Performed by: NURSE PRACTITIONER

## 2023-04-12 PROCEDURE — 25000003 PHARM REV CODE 250: Performed by: CLINICAL NURSE SPECIALIST

## 2023-04-12 PROCEDURE — 80053 COMPREHEN METABOLIC PANEL: CPT | Performed by: NURSE PRACTITIONER

## 2023-04-12 PROCEDURE — 84100 ASSAY OF PHOSPHORUS: CPT | Performed by: NURSE PRACTITIONER

## 2023-04-12 PROCEDURE — 99233 SBSQ HOSP IP/OBS HIGH 50: CPT | Mod: ,,, | Performed by: INTERNAL MEDICINE

## 2023-04-12 PROCEDURE — 63600175 PHARM REV CODE 636 W HCPCS: Performed by: INTERNAL MEDICINE

## 2023-04-12 PROCEDURE — 63600175 PHARM REV CODE 636 W HCPCS: Performed by: HOSPITALIST

## 2023-04-12 PROCEDURE — 99233 PR SUBSEQUENT HOSPITAL CARE,LEVL III: ICD-10-PCS | Mod: ,,, | Performed by: INTERNAL MEDICINE

## 2023-04-12 PROCEDURE — 25000003 PHARM REV CODE 250: Performed by: INTERNAL MEDICINE

## 2023-04-12 PROCEDURE — 85025 COMPLETE CBC W/AUTO DIFF WBC: CPT | Performed by: NURSE PRACTITIONER

## 2023-04-12 PROCEDURE — 97530 THERAPEUTIC ACTIVITIES: CPT

## 2023-04-12 PROCEDURE — 20600001 HC STEP DOWN PRIVATE ROOM

## 2023-04-12 PROCEDURE — 36415 COLL VENOUS BLD VENIPUNCTURE: CPT | Performed by: NURSE PRACTITIONER

## 2023-04-12 RX ADMIN — Medication 1 CAPSULE: at 09:04

## 2023-04-12 RX ADMIN — LIDOCAINE 1 PATCH: 50 PATCH CUTANEOUS at 09:04

## 2023-04-12 RX ADMIN — LATANOPROST 1 DROP: 50 SOLUTION OPHTHALMIC at 09:04

## 2023-04-12 RX ADMIN — HEPARIN SODIUM 5000 UNITS: 5000 INJECTION INTRAVENOUS; SUBCUTANEOUS at 03:04

## 2023-04-12 RX ADMIN — HYDROMORPHONE HYDROCHLORIDE 0.5 MG: 1 INJECTION, SOLUTION INTRAMUSCULAR; INTRAVENOUS; SUBCUTANEOUS at 07:04

## 2023-04-12 RX ADMIN — VANCOMYCIN HYDROCHLORIDE 125 MG: KIT at 03:04

## 2023-04-12 RX ADMIN — OXYCODONE HYDROCHLORIDE 5 MG: 5 TABLET ORAL at 08:04

## 2023-04-12 RX ADMIN — HYDROMORPHONE HYDROCHLORIDE 0.5 MG: 1 INJECTION, SOLUTION INTRAMUSCULAR; INTRAVENOUS; SUBCUTANEOUS at 06:04

## 2023-04-12 RX ADMIN — HEPARIN SODIUM 5000 UNITS: 5000 INJECTION INTRAVENOUS; SUBCUTANEOUS at 08:04

## 2023-04-12 RX ADMIN — OXYCODONE HYDROCHLORIDE 5 MG: 5 TABLET ORAL at 03:04

## 2023-04-12 RX ADMIN — ACETAMINOPHEN 1000 MG: 500 TABLET ORAL at 12:04

## 2023-04-12 RX ADMIN — HYDROMORPHONE HYDROCHLORIDE 0.5 MG: 1 INJECTION, SOLUTION INTRAMUSCULAR; INTRAVENOUS; SUBCUTANEOUS at 12:04

## 2023-04-12 RX ADMIN — DORZOLAMIDE HYDROCHLORIDE 1 DROP: 20 SOLUTION OPHTHALMIC at 09:04

## 2023-04-12 RX ADMIN — VANCOMYCIN HYDROCHLORIDE 125 MG: KIT at 09:04

## 2023-04-12 RX ADMIN — VANCOMYCIN HYDROCHLORIDE 125 MG: KIT at 08:04

## 2023-04-12 RX ADMIN — OXYCODONE HYDROCHLORIDE 5 MG: 5 TABLET ORAL at 09:04

## 2023-04-12 RX ADMIN — HEPARIN SODIUM 5000 UNITS: 5000 INJECTION INTRAVENOUS; SUBCUTANEOUS at 06:04

## 2023-04-12 RX ADMIN — ACETAMINOPHEN 1000 MG: 500 TABLET ORAL at 08:04

## 2023-04-12 NOTE — PT/OT/SLP PROGRESS
Occupational Therapy   Treatment    Name: Cristel Zelaya  MRN: [id number]  Admitting Diagnosis:  Septic shock       Recommendations:     Discharge Recommendations: nursing facility, skilled  Discharge Equipment Recommendations:  shower chair  Barriers to discharge:  None    Assessment:     Cristel Zelaya is a 63 y.o. female with a medical diagnosis of Septic shock.  Pt met sitting on BSC at beginning of session and willing to participate. Overall, she tolerated OT session well and required minimum-stand by (A) for all ADLs and functional mobility performed this day. Pt remains at risk for falls. Goals remain appropriate. Performance deficits affecting function are weakness, impaired endurance, impaired self care skills, impaired functional mobility, gait instability, impaired balance, decreased upper extremity function, decreased lower extremity function, decreased safety awareness, pain, impaired cardiopulmonary response to activity. Pt would continue to benefit from skilled OT services to regain functional independence in ADLs and functional mobility and facilitate safe discharge.    Rehab Prognosis:  Good; patient would benefit from acute skilled OT services to address these deficits and reach maximum level of function.       Plan:     Patient to be seen 3 x/week to address the above listed problems via self-care/home management, therapeutic activities, therapeutic exercises, neuromuscular re-education  Plan of Care Expires: 04/23/23  Plan of Care Reviewed with: patient    Subjective     Chief Complaint: pain  Patient/Family Comments/goals: to complete ADLs this AM  Pain/Comfort:  Pain Rating 1:  (unrated)  Location - Orientation 1: generalized  Location 1:  (abdomen and back)  Pain Addressed 1: Reposition, Distraction, Cessation of Activity, Nurse notified  Pain Rating Post-Intervention 1:  (unrated)    Objective:     Communicated with: Zeenat MCKEON prior to session.  Patient found  sitting on BSC  with  telemetry upon OT entry to room.    General Precautions: Standard, fall, special contact    Orthopedic Precautions:N/A  Braces: N/A  Respiratory Status: Room air     Occupational Performance:     Bed Mobility:    Not assessed- pt met sitting on BSC    Functional Mobility/Transfers:  Patient completed Sit <> Stand Transfer with contact guard assistance and minimum assistance  with  rolling walker   CGA from BSC, minimum (A) from bedside chair  Cues for hand placement  Patient completed Toilet Transfer BSC> bedside chair Step Transfer technique with contact guard assistance with  rolling walker  Functional Mobility: Pt engaged in functional mobility to simulate household/community distances across room ~30 ft x2 trials using RW with CGA-SBA in order to maximize functional endurance and standing balance required for engagement in occupations of choice     Activities of Daily Living:  Grooming: stand by assistance to brush teeth and wash face in standing at sinkside with RW for ~5 minutes  Bathing: stand by assistance to wash b/l UE and LE sitting upright on bedside chair  Toileting: contact guard assistance for functional balance/endurance for pt to complete pericare in standing with RW      [facility] 6 Click ADL: 17    Treatment & Education:  -Education on energy conservation and task modification to maximize safety and (I) during ADLs and mobility  -Education on importance of OOB activity to improve overall activity tolerance and promote recovery  -Pt educated to call for assistance and to transfer with hospital staff only  -Provided education regarding role of OT, POC, & discharge recommendations with pt verbalizing understanding.  Pt had no further questions & when asked whether there were any concerns pt reported none.     Patient left up in chair with all lines intact, call button in reach, and RN notified    GOALS:   Multidisciplinary Problems       Occupational Therapy Goals          Problem: Occupational Therapy     Goal Priority Disciplines Outcome Interventions   Occupational Therapy Goal     OT, PT/OT Ongoing, Progressing    Description: Goals to be met by: 4/23/23     Patient will increase functional independence with ADLs by performing:    UE Dressing with Modified [place].  LE Dressing with Stand-by Assistance.  Grooming while seated with Modified [place].  Toileting from bedside commode with Contact Guard Assistance for hygiene and clothing management.   Toilet transfer to bedside commode with Contact Guard Assistance.  Upper extremity exercise program 10 reps per handout, with supervision.                         Time Tracking:     OT Date of Treatment: 04/12/23  OT Start Time: 0855  OT Stop Time: 0921  OT Total Time (min): 26 min    Billable Minutes:Self Care/Home Management 16  Therapeutic Activity 10    OT/BOSSMAN: OT          4/12/2023

## 2023-04-12 NOTE — NURSING
Pt in bed resting. Assessed. Denies any needs. All personal items within reach. Report given to MIKE Epperson

## 2023-04-12 NOTE — PROGRESS NOTES
[facility] - Tele[facility] Medicine  Progress Note    Patient Name: Cristel Zelaya  MRN: [id number]  Patient Class: IP- Inpatient   Admission Date: 4/5/2023  Length of Stay: 7 days  Attending Physician: Padmaja Clay MD  Primary Care Provider: Primary Doctor No        Subjective:     Principal Problem:Septic shock        HPI:  Cristel Zelaya is a 63 year old female with a PMH of HTN, WILMA, CKD, and recent L4-5 posterior decompression and fusion on 3/20 who presented to the ED with abdominal pain, nausea, diarrhea, and weakness. Daughter provided most history on exam. Patient underwent a L4-5 posterior decompression and fusion on 3/20 for chronic LBP TX. She subsequently was hospitalized from 3/20-3/27, followed by inpatient rehab from 3/27-3/31. Per daughter, there were several other patients at the facility who developed diarrhea and had to be admitted.After discharge, she has been staying with her daughter in [place]. She reports that she had had diarrhea over the past few days with 6-7 loose bowel movements/day. No blood reported in her stool. She has also had nausea without vomiting. This morning she developed epigastric abdominal pain which she describes as 8/10, and squeezing. Daughter reports that she has been drinking pedialyte, but over the past day has had decreased po intake, last intake 5:30PM with increased weakness and fatigue. Daughter grew concerned when she became less responsive and seemed more dehydrated with cracked lips at home. No fevers/chills, URI symptoms, urinary symptoms reported.      On arrival, patient with leukocytosis of 13.11, LA increased to 2.4. Blood gas with pH 7.345, pCO2 31.9, pO2 28, HCO3 17.4.  Hyponatremic to 123, hypochloremic to 90, hyperphosphatemia to 6.1. Decreased bicarb of 16. Cr/BUN increased to 7.0/117, baseline Cr ~1.  In the ED, she was given 2 L IV fluid and started on zosyn, metronizole, and po vancomycin given C. Diff concerns given  history. Given persistent hypotension 90s/50s despite fluid resuscitation, patient started on levophed. Patient admitted to the MICU for shock requiring pressor support.    ICU course:     CT A/P concerning with colitis. C diff and stool studies ordered, C diff not collected while in ICU. MIKAEL and electrolyte abnormalities slowly improving with IVF. MRI lumbar spine without concern for infection. Patient able to be weaned off pressors and stepped down to medicine on 4/7.      Overview/Hospital Course:  Mrs. Zelaya was stepped down to hospital medicine for continued treatment of colitis and electrolyte abnormalities.  She continues to have diarrhea and significant abdominal pain.    No chest pain, shortness of breath, lightheadedness. Tolerating oral intake.     Patient down to 3 bowel movements a day.  Still with abdominal discomfort but improving.    NAD, AO3  NC, AT  RRR  CTAB  S, mild diffuse tenderness without rebound or guarding, ND+BS  No edema   PERRL    Vitals, labs and radiographs from past 24h reviewed and personally interpreted.       Assessment/Plan:      * Septic shock  2/2 c diff colitis  Admitted from rehab center w/ abdominal pain, diarrhea and confusion. Hypotensive in the ED. Labs w/ mild leukocytosis, anemia, Na 123, HCO3 16, , Cr 7.0, phos 6.1. Received IVFs in the ED along w/ BSabx. LA 2.4 -->2.2. Admitted to MICU for shock requiring vasopressors. Patient started on IV vanc/zosyn for concern for coliitis, as well as PO vanc for presumed C diff. Able to wean off pressors and patient stepped down 4/7.  Septic shock with need for vasopressors, MIKAEL previously    -treat underlying cause (c diff)  -f/u cultures  4/11: stopping iVF. Follow off.   4/12: mild compromise of renal function; follow    Hypokalemia  Due to continued diarrhea and possible post ATN diuresis  - replacing PO    S/P lumbar fusion  S/p L4-5 posterior decompression and fusion on 3/20 and admitted to rehab 3/27-3/31  - MRI  unremarkable for infection source   - pain control  - PT/OT  - will likely need to return to rehab    Hypoalbuminemia  - boost TIDWM      Hyperphosphatemia  Resolved  - Phos 6.1 on admission in the setting of MIKAEL, now resolved with improvement in MIKAEL    Increased anion gap metabolic acidosis  - in setting of c diff colitis and prerenal MIKAEL  - improving   - started on sodium bicarb in ICU, will discontinue now that co2 improving and ag resolved)    Hyponatremia  - Na 123 on admit, currently 135  - in the setting of hypovolemia given ongoing diarrhea  - improvement w/ volume resuscitation    Anemia  - Hgb 9.3, 11.1 on 2/6 as per care everywhere records  - stool occult blood positive, but no melena, hematochezia--> suspect 2/2 to c diff infection  - monitor for signs of bleeding, daily labs  - if any evidence of bleeding, consider GI consult    MIKAEL (acute kidney injury)  Patient with acute kidney injury likely due to IVVD/dehydration and acute tubular necrosis MIKAEL is currently improving. Labs reviewed- Renal function/electrolytes with Estimated Creatinine Clearance: 26.5 mL/min (A) (based on SCr of 2.1 mg/dL (H)). according to latest data. Monitor urine output and serial BMP and adjust therapy as needed. Avoid nephrotoxins and renally dose meds for GFR listed above.     - Baseline Cr 1.0, Cr 7.0 on admission --> now 2.1  - continue with mIVF given continued diarrhea still on 4/10  - avoid nephrotoxic agents  -4/11: follow off IVF  -4/12: Mild compromise of renal function. Follow      Generalized abdominal pain  - due to C diff, monitor for improvement    C. difficile colitis  Septic shock 2/2 c diff. CT A/P with colitis on admission and started on PO vanc. C diff collected after ICU transfer and returned + so IV vanc/zosyn discontinued. C diff antigen positive, toxin negative, PCR positive  - continue Vancomycin PO likely course 10-14 days  - blood cultures NGTD X 2  - continue mIVF  - CLD, advance as tolerated; 4/11:  advancing to full  - C diff precautions      Elevated fecal calprotectin   -likely requires GI consult      VTE Risk Mitigation (From admission, onward)           Ordered     heparin (porcine) injection 5,000 Units  Every 8 hours         04/06/23 1335     Place sequential compression device  Until discontinued         04/05/23 2014     IP VTE LOW RISK PATIENT  Once         04/05/23 2014                    Discharge Planning   DAISY: 4/14/2023     Code Status: Full Code   Is the patient medically ready for discharge?: No    Reason for patient still in hospital (select all that apply): Treatment  Discharge Plan A: Home Health, Home with family                  Padmaja Clay MD  Department of Hospital Medicine   [facility] - Telemetry Stepdown

## 2023-04-12 NOTE — PLAN OF CARE
Plan of care reviewed with pt. Pt aaox4. Pt had lost of Bms today, but frequency decreased from yesterday. PO vanc continued. . Pt still on contact for cdiff. Pain to back controlled with dilaudid and oxy. Pt take both around the clock. VSS. Pt remained free of falls, trauma, and injury. Will continue to monitor.

## 2023-04-12 NOTE — PROGRESS NOTES
[facility] - Tele[facility] Medicine  Progress Note    Patient Name: Cristel Zelaya  MRN: [id number]  Patient Class: IP- Inpatient   Admission Date: 4/5/2023  Length of Stay: 6 days  Attending Physician: Padmaja Clay MD  Primary Care Provider: Primary Doctor No        Subjective:     Principal Problem:Septic shock        HPI:  Cristel Zelaya is a 63 year old female with a PMH of HTN, WILMA, CKD, and recent L4-5 posterior decompression and fusion on 3/20 who presented to the ED with abdominal pain, nausea, diarrhea, and weakness. Daughter provided most history on exam. Patient underwent a L4-5 posterior decompression and fusion on 3/20 for chronic LBP TX. She subsequently was hospitalized from 3/20-3/27, followed by inpatient rehab from 3/27-3/31. Per daughter, there were several other patients at the facility who developed diarrhea and had to be admitted.After discharge, she has been staying with her daughter in [place]. She reports that she had had diarrhea over the past few days with 6-7 loose bowel movements/day. No blood reported in her stool. She has also had nausea without vomiting. This morning she developed epigastric abdominal pain which she describes as 8/10, and squeezing. Daughter reports that she has been drinking pedialyte, but over the past day has had decreased po intake, last intake 5:30PM with increased weakness and fatigue. Daughter grew concerned when she became less responsive and seemed more dehydrated with cracked lips at home. No fevers/chills, URI symptoms, urinary symptoms reported.      On arrival, patient with leukocytosis of 13.11, LA increased to 2.4. Blood gas with pH 7.345, pCO2 31.9, pO2 28, HCO3 17.4.  Hyponatremic to 123, hypochloremic to 90, hyperphosphatemia to 6.1. Decreased bicarb of 16. Cr/BUN increased to 7.0/117, baseline Cr ~1.  In the ED, she was given 2 L IV fluid and started on zosyn, metronizole, and po vancomycin given C. Diff concerns given  history. Given persistent hypotension 90s/50s despite fluid resuscitation, patient started on levophed. Patient admitted to the MICU for shock requiring pressor support.    ICU course:     CT A/P concerning with colitis. C diff and stool studies ordered, C diff not collected while in ICU. MIKAEL and electrolyte abnormalities slowly improving with IVF. MRI lumbar spine without concern for infection. Patient able to be weaned off pressors and stepped down to medicine on 4/7.      Overview/Hospital Course:  Mrs. Zelaya was stepped down to hospital medicine for continued treatment of colitis and electrolyte abnormalities.  She continues to have diarrhea and significant abdominal pain.    No chest pain, shortness of breath, lightheadedness. Tolerating oral intake.     Patient still having more than 10 bowel movements a day but volume of stool reducing.  Still with abdominal discomfort but improving.    NAD, AO3  NC, AT  RRR  CTAB  S, mild diffuse tenderness without rebound or guarding, ND+BS  No edema   PERRL    Vitals, labs and radiographs from past 24h reviewed and personally interpreted.       Assessment/Plan:      * Septic shock  2/2 c diff colitis  Admitted from rehab center w/ abdominal pain, diarrhea and confusion. Hypotensive in the ED. Labs w/ mild leukocytosis, anemia, Na 123, HCO3 16, , Cr 7.0, phos 6.1. Received IVFs in the ED along w/ BSabx. LA 2.4 -->2.2. Admitted to MICU for shock requiring vasopressors. Patient started on IV vanc/zosyn for concern for coliitis, as well as PO vanc for presumed C diff. Able to wean off pressors and patient stepped down 4/7.  Septic shock with need for vasopressors, MIKAEL previously    -treat underlying cause (c diff)  -f/u cultures  4/11: stopping iVF. Follow off.     Hypokalemia  Due to continued diarrhea and possible post ATN diuresis  - replacing PO    S/P lumbar fusion  S/p L4-5 posterior decompression and fusion on 3/20 and admitted to rehab 3/27-3/31  - MRI  unremarkable for infection source   - pain control  - PT/OT  - will likely need to return to rehab    Hypoalbuminemia  - boost TIDWM      Hyperphosphatemia  Resolved  - Phos 6.1 on admission in the setting of MIKAEL, now resolved with improvement in MIKAEL    Increased anion gap metabolic acidosis  - in setting of c diff colitis and prerenal MIKAEL  - improving   - started on sodium bicarb in ICU, will discontinue now that co2 improving and ag resolved)    Hyponatremia  - Na 123 on admit, currently 135  - in the setting of hypovolemia given ongoing diarrhea  - improvement w/ volume resuscitation    Anemia  - Hgb 9.3, 11.1 on 2/6 as per care everywhere records  - stool occult blood positive, but no melena, hematochezia--> suspect 2/2 to c diff infection  - monitor for signs of bleeding, daily labs  - if any evidence of bleeding, consider GI consult    MIKAEL (acute kidney injury)  Patient with acute kidney injury likely due to IVVD/dehydration and acute tubular necrosis MIKAEL is currently improving. Labs reviewed- Renal function/electrolytes with Estimated Creatinine Clearance: 26.5 mL/min (A) (based on SCr of 2.1 mg/dL (H)). according to latest data. Monitor urine output and serial BMP and adjust therapy as needed. Avoid nephrotoxins and renally dose meds for GFR listed above.     - Baseline Cr 1.0, Cr 7.0 on admission --> now 2.1  - continue with mIVF given continued diarrhea still on 4/10  - avoid nephrotoxic agents  -4/11: follow off IVF      Generalized abdominal pain  - due to C diff, monitor for improvement    C. difficile colitis  Septic shock 2/2 c diff. CT A/P with colitis on admission and started on PO vanc. C diff collected after ICU transfer and returned + so IV vanc/zosyn discontinued. C diff antigen positive, toxin negative, PCR positive  - continue Vancomycin PO likely course 10-14 days  - blood cultures NGTD X 2  - continue mIVF  - CLD, advance as tolerated; 4/11: advancing to full  - C diff precautions        VTE  Risk Mitigation (From admission, onward)           Ordered     heparin (porcine) injection 5,000 Units  Every 8 hours         04/06/23 1335     Place sequential compression device  Until discontinued         04/05/23 2014     IP VTE LOW RISK PATIENT  Once         04/05/23 2014                    Discharge Planning   DAISY: 4/14/2023     Code Status: Full Code   Is the patient medically ready for discharge?: No    Reason for patient still in hospital (select all that apply): Treatment  Discharge Plan A: Home Health, Home with family                  Padmaja Clay MD  Department of Hospital Medicine   [facility] - Telemetry Stepdown

## 2023-04-13 VITALS
BODY MASS INDEX: 26.46 KG/M2 | HEART RATE: 99 BPM | SYSTOLIC BLOOD PRESSURE: 135 MMHG | HEIGHT: 64 IN | RESPIRATION RATE: 20 BRPM | TEMPERATURE: 99 F | OXYGEN SATURATION: 98 % | DIASTOLIC BLOOD PRESSURE: 77 MMHG | WEIGHT: 155 LBS

## 2023-04-13 LAB
ALBUMIN SERPL BCP-MCNC: 1.9 G/DL (ref 3.5–5.2)
ALP SERPL-CCNC: 106 U/L (ref 55–135)
ALT SERPL W/O P-5'-P-CCNC: 43 U/L (ref 10–44)
ANION GAP SERPL CALC-SCNC: 10 MMOL/L (ref 8–16)
AST SERPL-CCNC: 46 U/L (ref 10–40)
BASOPHILS # BLD AUTO: 0.02 K/UL (ref 0–0.2)
BASOPHILS NFR BLD: 0.5 % (ref 0–1.9)
BILIRUB SERPL-MCNC: 0.4 MG/DL (ref 0.1–1)
BUN SERPL-MCNC: 23 MG/DL (ref 8–23)
CALCIUM SERPL-MCNC: 7.8 MG/DL (ref 8.7–10.5)
CHLORIDE SERPL-SCNC: 110 MMOL/L (ref 95–110)
CO2 SERPL-SCNC: 24 MMOL/L (ref 23–29)
CREAT SERPL-MCNC: 1.2 MG/DL (ref 0.5–1.4)
DIFFERENTIAL METHOD: ABNORMAL
EOSINOPHIL # BLD AUTO: 0 K/UL (ref 0–0.5)
EOSINOPHIL NFR BLD: 0.5 % (ref 0–8)
ERYTHROCYTE [DISTWIDTH] IN BLOOD BY AUTOMATED COUNT: 14.7 % (ref 11.5–14.5)
EST. GFR  (NO RACE VARIABLE): 50.9 ML/MIN/1.73 M^2
GLUCOSE SERPL-MCNC: 98 MG/DL (ref 70–110)
H PYLORI AG STL QL IA: NOT DETECTED
HCT VFR BLD AUTO: 27.2 % (ref 37–48.5)
HGB BLD-MCNC: 8.2 G/DL (ref 12–16)
IMM GRANULOCYTES # BLD AUTO: 0.04 K/UL (ref 0–0.04)
IMM GRANULOCYTES NFR BLD AUTO: 1 % (ref 0–0.5)
LYMPHOCYTES # BLD AUTO: 1.4 K/UL (ref 1–4.8)
LYMPHOCYTES NFR BLD: 32.9 % (ref 18–48)
MAGNESIUM SERPL-MCNC: 1.6 MG/DL (ref 1.6–2.6)
MCH RBC QN AUTO: 25.2 PG (ref 27–31)
MCHC RBC AUTO-ENTMCNC: 30.1 G/DL (ref 32–36)
MCV RBC AUTO: 84 FL (ref 82–98)
MONOCYTES # BLD AUTO: 0.4 K/UL (ref 0.3–1)
MONOCYTES NFR BLD: 9.9 % (ref 4–15)
NEUTROPHILS # BLD AUTO: 2.3 K/UL (ref 1.8–7.7)
NEUTROPHILS NFR BLD: 55.2 % (ref 38–73)
NRBC BLD-RTO: 0 /100 WBC
PHOSPHATE SERPL-MCNC: 3.3 MG/DL (ref 2.7–4.5)
PLATELET # BLD AUTO: 199 K/UL (ref 150–450)
PMV BLD AUTO: 9.9 FL (ref 9.2–12.9)
POTASSIUM SERPL-SCNC: 4.1 MMOL/L (ref 3.5–5.1)
PROT SERPL-MCNC: 5.3 G/DL (ref 6–8.4)
RBC # BLD AUTO: 3.25 M/UL (ref 4–5.4)
SODIUM SERPL-SCNC: 144 MMOL/L (ref 136–145)
SPECIMEN SOURCE: NORMAL
WBC # BLD AUTO: 4.13 K/UL (ref 3.9–12.7)

## 2023-04-13 PROCEDURE — 36415 COLL VENOUS BLD VENIPUNCTURE: CPT | Performed by: NURSE PRACTITIONER

## 2023-04-13 PROCEDURE — 85025 COMPLETE CBC W/AUTO DIFF WBC: CPT | Performed by: NURSE PRACTITIONER

## 2023-04-13 PROCEDURE — 25000003 PHARM REV CODE 250: Performed by: CLINICAL NURSE SPECIALIST

## 2023-04-13 PROCEDURE — 84100 ASSAY OF PHOSPHORUS: CPT | Performed by: NURSE PRACTITIONER

## 2023-04-13 PROCEDURE — 99222 1ST HOSP IP/OBS MODERATE 55: CPT | Mod: ,,, | Performed by: INTERNAL MEDICINE

## 2023-04-13 PROCEDURE — 99222 PR INITIAL HOSPITAL CARE,LEVL II: ICD-10-PCS | Mod: ,,, | Performed by: INTERNAL MEDICINE

## 2023-04-13 PROCEDURE — 97116 GAIT TRAINING THERAPY: CPT

## 2023-04-13 PROCEDURE — 99239 PR HOSPITAL DISCHARGE DAY,>30 MIN: ICD-10-PCS | Mod: ,,, | Performed by: INTERNAL MEDICINE

## 2023-04-13 PROCEDURE — 63600175 PHARM REV CODE 636 W HCPCS: Performed by: INTERNAL MEDICINE

## 2023-04-13 PROCEDURE — 97530 THERAPEUTIC ACTIVITIES: CPT

## 2023-04-13 PROCEDURE — 1111F PR DISCHARGE MEDS RECONCILED W/ CURRENT OUTPATIENT MED LIST: ICD-10-PCS | Mod: CPTII,,, | Performed by: INTERNAL MEDICINE

## 2023-04-13 PROCEDURE — 25000003 PHARM REV CODE 250: Performed by: PHYSICIAN ASSISTANT

## 2023-04-13 PROCEDURE — 63600175 PHARM REV CODE 636 W HCPCS: Performed by: HOSPITALIST

## 2023-04-13 PROCEDURE — 97535 SELF CARE MNGMENT TRAINING: CPT

## 2023-04-13 PROCEDURE — 99239 HOSP IP/OBS DSCHRG MGMT >30: CPT | Mod: ,,, | Performed by: INTERNAL MEDICINE

## 2023-04-13 PROCEDURE — 25000003 PHARM REV CODE 250: Performed by: NURSE PRACTITIONER

## 2023-04-13 PROCEDURE — 80053 COMPREHEN METABOLIC PANEL: CPT | Performed by: NURSE PRACTITIONER

## 2023-04-13 PROCEDURE — 1111F DSCHRG MED/CURRENT MED MERGE: CPT | Mod: CPTII,,, | Performed by: INTERNAL MEDICINE

## 2023-04-13 PROCEDURE — 83735 ASSAY OF MAGNESIUM: CPT | Performed by: NURSE PRACTITIONER

## 2023-04-13 PROCEDURE — 25000003 PHARM REV CODE 250: Performed by: INTERNAL MEDICINE

## 2023-04-13 RX ORDER — OXYCODONE AND ACETAMINOPHEN 5; 325 MG/1; MG/1
1 TABLET ORAL EVERY 6 HOURS PRN
Qty: 15 TABLET | Refills: 0 | Status: SHIPPED | OUTPATIENT
Start: 2023-04-13

## 2023-04-13 RX ORDER — ONDANSETRON 4 MG/1
4 TABLET, FILM COATED ORAL EVERY 6 HOURS PRN
Qty: 15 TABLET | Refills: 0 | Status: SHIPPED | OUTPATIENT
Start: 2023-04-13

## 2023-04-13 RX ORDER — HYDROCHLOROTHIAZIDE 12.5 MG/1
12.5 CAPSULE ORAL DAILY
Start: 2023-04-13

## 2023-04-13 RX ADMIN — HYDROMORPHONE HYDROCHLORIDE 0.5 MG: 1 INJECTION, SOLUTION INTRAMUSCULAR; INTRAVENOUS; SUBCUTANEOUS at 12:04

## 2023-04-13 RX ADMIN — HEPARIN SODIUM 5000 UNITS: 5000 INJECTION INTRAVENOUS; SUBCUTANEOUS at 06:04

## 2023-04-13 RX ADMIN — HYDROMORPHONE HYDROCHLORIDE 0.5 MG: 1 INJECTION, SOLUTION INTRAMUSCULAR; INTRAVENOUS; SUBCUTANEOUS at 08:04

## 2023-04-13 RX ADMIN — OXYCODONE HYDROCHLORIDE 5 MG: 5 TABLET ORAL at 11:04

## 2023-04-13 RX ADMIN — VANCOMYCIN HYDROCHLORIDE 125 MG: KIT at 08:04

## 2023-04-13 RX ADMIN — HEPARIN SODIUM 5000 UNITS: 5000 INJECTION INTRAVENOUS; SUBCUTANEOUS at 03:04

## 2023-04-13 RX ADMIN — VANCOMYCIN HYDROCHLORIDE 125 MG: KIT at 03:04

## 2023-04-13 RX ADMIN — DORZOLAMIDE HYDROCHLORIDE 1 DROP: 20 SOLUTION OPHTHALMIC at 08:04

## 2023-04-13 RX ADMIN — OXYCODONE HYDROCHLORIDE 5 MG: 5 TABLET ORAL at 03:04

## 2023-04-13 RX ADMIN — Medication 1 CAPSULE: at 08:04

## 2023-04-13 RX ADMIN — LIDOCAINE 1 PATCH: 50 PATCH CUTANEOUS at 08:04

## 2023-04-13 NOTE — PLAN OF CARE
04/13/23 1550   Post-Acute Status   Post-Acute Authorization Home Health   Home Health Status Referrals Sent      referrals sent via [facility].    Wendie Becker RN  Ext [phone]

## 2023-04-13 NOTE — NURSING
Discharge education completed with patient and daughter at the bedside.  Instructed patient to make and keep all follow up appointments.  Reviewed new medications and medication changes with patient.  Reviewed signs and symptoms of infection, signs and symptoms of complications, activity restrictions, and when to call for help.  Patient and daughter verbalized understanding of all discharge education.  All questions answered at this time with teach back.

## 2023-04-13 NOTE — PT/OT/SLP PROGRESS
"Occupational Therapy   Treatment    Name: Cristel Zelaya  MRN: [id number]  Admitting Diagnosis:  Septic shock       Recommendations:     Discharge Recommendations: home health OT  Discharge Equipment Recommendations:  shower chair  Barriers to discharge:  None    Assessment:     Cristel Zelaya is a 63 y.o. female with a medical diagnosis of Septic shock.      Upon therapist arrival, Pt was in bed with HOB elevated. During this date, Pt is pleasant and agreeable to therapy. Pt requiresd  SBA for bed mobility, and CGA- SBA for functional mobility.  Pt demonstrates some difficulty with LBD,2/2 to decreased ROM, with therapist  educating Pt on figure 4   position to assist with lower body dressing. Pt focused OT session on  ADLs this date, performing  grooming and bathing ADLs with SBA in standing.  Pt tolerated ~ 12 minutes in standing with no noted LOB or  SOB. Pt  is making progress towards goals but continues to perform below baseline.  She presents with deficits listed below. Performance deficits affecting function are weakness, impaired endurance, impaired functional mobility, gait instability, pain, decreased ROM.  OT updating recommendation to HHOT 2/2 to Pt's functional improvements.     Rehab Prognosis:  Good; patient would benefit from acute skilled OT services to address these deficits and reach maximum level of function.       Plan:     Patient to be seen 3 x/week to address the above listed problems via self-care/home management, therapeutic activities, therapeutic exercises, neuromuscular re-education  Plan of Care Expires: 04/23/23  Plan of Care Reviewed with: patient    Subjective     Chief Complaint: " my stomach hurts from using it so much"  Patient/Family Comments/goals:  get Cdiff treated  Pain/Comfort:  Pain Rating 1: 7/10  Location - Orientation 1: generalized  Location 1: abdomen  Pain Addressed 1: Distraction, Reposition, Nurse notified  Pain Rating Post-Intervention 1: 7/10    Objective: "     Communicated with: Nurse Parsons prior to session.  Patient found HOB elevated with telemetry upon OT entry to room.    General Precautions: Standard, fall, special contact    Orthopedic Precautions:N/A  Braces: N/A  Respiratory Status: Room air     Occupational Performance:     Bed Mobility:    Patient completed Rolling/Turning to Left with  stand by assistance  Patient completed Scooting/Bridging with stand by assistance  Patient completed Supine to Sit with stand by assistance  Patient completed Sit to Supine with stand by assistance     Functional Mobility/Transfers:  Patient completed Sit <> Stand Transfer with stand by assistance  with  no assistive device   Functional Mobility: Pt walked ~ 15' x2 trials with CGA- SBA and RW.      Activities of Daily Living:  Grooming: stand by assistance while standing at the sink  Bathing: stand by assistance while standing at bedside with warm towel, warm water, and soap  Upper Body Dressing: minimum assistance to cynthia hospital gown   Lower Body Dressing: minimum assistance to cynthia non slip socks while seated EOB with figure 4 technique.       [facility] 6 Click ADL: 21    Treatment & Education:  Role of OT and OT POC  Fall Prevention/Safety Awareness Training - RW management during functional mobility and standing ADLs to decrease risk of falls  Lower body Dressing on figure 4 technique to cynthia/doff clothing  to decrease forward trunk flexion and maintain balance while seated EOB  Educated on continued OOB activity with Staff presence and impact on increasing functional independence.  Educated on importance of progressive mobilization to increase strength and endurance.      Patient left HOB elevated with all lines intact, call button in reach, and nurse notified    GOALS:   Multidisciplinary Problems       Occupational Therapy Goals          Problem: Occupational Therapy    Goal Priority Disciplines Outcome Interventions   Occupational Therapy Goal     OT, PT/OT Ongoing,  Progressing    Description: Goals to be met by: 4/23/23     Patient will increase functional independence with ADLs by performing:    UE Dressing with Modified [place].  LE Dressing with Stand-by Assistance.  Grooming while standing at sink with Modified [place].  Toileting from bedside commode with Contact Guard Assistance for hygiene and clothing management.   Toilet transfer to bedside commode with Contact Guard Assistance.  Upper extremity exercise program 10 reps per handout, with supervision.                         Time Tracking:     OT Date of Treatment: 04/13/23  OT Start Time: 1027  OT Stop Time: 1106  OT Total Time (min): 39 min    Billable Minutes:Self Care/Home Management 25  Therapeutic Activity 14    OT/BOSSMAN: OT          4/13/2023

## 2023-04-13 NOTE — PLAN OF CARE
Met with patient to discuss her discharge plan. This CM informed Ms. Zelaya that [facility] does not have any available beds at this time. CM inquired about sending additional referrals to skilled facilities as patient is expected to be stable for discharge tomorrow. Ms. Zelaya expressed understanding but requested that I call her daughter Sade as she lives in [place], [place] and is currently staying with her daughter her in [place]. A call was then placed to patient's daughter Sade ([phone]). Left voicemail requesting call back.    14:50 PM  CM met with patient and her daughter to discuss discharge planning. Therapy is now recommending home with  services. Ms. Zelaya is expected to discharge home this afternoon. Both are in agreement with the discharge plan.    Wendie Becker RN  Ext [phone]

## 2023-04-13 NOTE — PLAN OF CARE
[facility] - Telemetry Stepdown      HOME HEALTH ORDERS  FACE TO FACE ENCOUNTER    Patient Name: Cristel Zelaya  YOB: 1960    PCP: Primary Doctor No   PCP Address: None  PCP Phone Number: None  PCP Fax: None    Encounter Date: 4/5/23    Admit to Home Health    Diagnoses:  Active Hospital Problems    Diagnosis  POA    *Septic shock [A41.9, R65.21]  Yes    Hypokalemia [E87.6]  Yes    C. difficile colitis [A04.72]  Yes    Generalized abdominal pain [R10.84]  Yes    MIKAEL (acute kidney injury) [N17.9]  Yes    Anemia [D64.9]  Yes    Hyponatremia [E87.1]  Yes    Increased anion gap metabolic acidosis [E87.29]  Yes    Hyperphosphatemia [E83.39]  Yes    Hypoalbuminemia [E88.09]  Yes    S/P lumbar fusion [Z98.1]  Not Applicable      Resolved Hospital Problems   No resolved problems to display.       Follow Up Appointments:  No future appointments.    Allergies:Review of patient's allergies indicates:  No Known Allergies    Medications: Review discharge medications with patient and family and provide education.    Current Facility-Administered Medications   Medication Dose Route Frequency Provider Last Rate Last Admin    acetaminophen tablet 1,000 mg  1,000 mg Oral Q8H PRN Genaro Mcmahan NP   1,000 mg at 04/12/23 2045    dorzolamide 2 % ophthalmic solution 1 drop  1 drop Both Eyes BID Daylin Valdez MD   1 drop at 04/13/23 0840    heparin (porcine) injection 5,000 Units  5,000 Units Subcutaneous Q8H Bucky Boyle MD   5,000 Units at 04/13/23 1516    HYDROmorphone injection 0.5 mg  0.5 mg Intravenous Q4H PRN Daylin Valdez MD   0.5 mg at 04/13/23 1252    Lactobacillus rhamnosus GG capsule 1 capsule  1 capsule Oral Daily Rosemarie Roger PA-C   1 capsule at 04/13/23 0839    latanoprost 0.005 % ophthalmic solution 1 drop  1 drop Both Eyes QHS Daylin Valdez MD   1 drop at 04/12/23 2100    LIDOcaine 5 % patch 1 patch  1 patch Transdermal Daily Genaro Mcmahan NP   1 patch at 04/13/23 0839     ondansetron injection 4 mg  4 mg Intravenous Q8H PRN Genaro Mcmahan NP   4 mg at 04/08/23 0824    oxyCODONE immediate release tablet 5 mg  5 mg Oral Q6H PRN Bucky Boyle MD   5 mg at 04/13/23 1151    sodium chloride 0.9% flush 10 mL  10 mL Intravenous PRN Genaro Mcmahan NP        vancomycin 125 mg/5 mL oral solution 125 mg  125 mg Oral Q6H Fiona Winterbottom, APRN, CNS   125 mg at 04/13/23 1517     Current Discharge Medication List        START taking these medications    Details   ondansetron (ZOFRAN) 4 MG tablet Take 1 tablet (4 mg total) by mouth every 6 (six) hours as needed for Nausea.  Qty: 15 tablet, Refills: 0      oxyCODONE-acetaminophen (PERCOCET) 5-325 mg per tablet Take 1 tablet by mouth every 6 (six) hours as needed for Pain.  Qty: 15 tablet, Refills: 0    Comments: Quantity prescribed more than 7 day supply? No      vancomycin 125 mg/5 mL Soln Take 5 mLs (125 mg total) by mouth every 6 (six) hours. for 11 days  Qty: 300 mL, Refills: 0           CONTINUE these medications which have CHANGED    Details   hydroCHLOROthiazide (MICROZIDE) 12.5 mg capsule Take 1 capsule (12.5 mg total) by mouth once daily. See PCP in one week. Hold this medicine until you see your PCP    Comments: .           CONTINUE these medications which have NOT CHANGED    Details   ALPRAZolam (XANAX) 0.5 MG tablet Take 0.5 mg by mouth nightly as needed.      dorzolamide (TRUSOPT) 2 % ophthalmic solution Place 1 drop into both eyes 2 (two) times a day.      gabapentin (NEURONTIN) 300 MG capsule Take 300 mg by mouth 3 (three) times daily.      latanoprost 0.005 % ophthalmic solution Place 1 drop into both eyes every evening.      lisinopriL 10 MG tablet Take 10 mg by mouth 2 (two) times daily.      rosuvastatin (CRESTOR) 10 MG tablet Take 10 mg by mouth every evening.      tiZANidine (ZANAFLEX) 4 MG tablet Take 4 mg by mouth 3 (three) times daily.      traZODone (DESYREL) 50 MG tablet Take 50 mg by mouth every evening.            STOP taking these medications       diclofenac (VOLTAREN) 50 MG EC tablet Comments:   Reason for Stopping:                 I have seen and examined this patient within the last 30 days. My clinical findings that support the need for the home health skilled services and home bound status are the following:no   Weakness/numbness causing balance and gait disturbance due to Infection making it taxing to leave home.     Diet:   regular diet      Referrals/ Consults  Physical Therapy to evaluate and treat. Evaluate for home safety and equipment needs; Establish/upgrade home exercise program. Perform / instruct on therapeutic exercises, gait training, transfer training, and Range of Motion.  Occupational Therapy to evaluate and treat. Evaluate home environment for safety and equipment needs. Perform/Instruct on transfers, ADL training, ROM, and therapeutic exercises.    Activities:   activity as tolerated    Nursing:   Agency to admit patient within 24 hours of hospital discharge unless specified on physician order or at patient request    SN to complete comprehensive assessment including routine vital signs. Instruct on disease process and s/s of complications to report to MD. Review/verify medication list sent home with the patient at time of discharge  and instruct patient/caregiver as needed. Frequency may be adjusted depending on start of care date.     Skilled nurse to perform up to 3 visits PRN for symptoms related to diagnosis    Notify MD if SBP > 160 or < 90; DBP > 90 or < 50; HR > 120 or < 50; Temp > 101; O2 < 88%; Other:       Ok to schedule additional visits based on staff availability and patient request on consecutive days within the home health episode.    When multiple disciplines ordered:    Start of Care occurs on Sunday - Wednesday schedule remaining discipline evaluations as ordered on separate consecutive days following the start of care.    Thursday SOC -schedule subsequent evaluations Friday  and Monday the following week.     Friday - Saturday SOC - schedule subsequent discipline evaluations on consecutive days starting Monday of the following week.    For all post-discharge communication and subsequent orders please contact patient's primary care physician. If unable to reach primary care physician or do not receive response within 30 minutes, please contact [facility] clinician on call for clinical staff order clarification      Home Health Aide:  Nursing Three times weekly, Physical Therapy Three times weekly, and Occupational Therapy Three times weekly    Wound Care Orders  no    I certify that this patient is confined to her home and needs intermittent skilled nursing care, physical therapy, and occupational therapy.

## 2023-04-13 NOTE — CONSULTS
[facility]  Gastroenterology  Consult Note    Patient Name: Cristel Zelaya  MRN: [id number]  Admission Date: 4/5/2023  Hospital Length of Stay: 8 days  Code Status: Full Code   Attending Provider: Padmaja Clay MD   Consulting Provider: Colton Mercado MD  Primary Care Physician: Primary Doctor No  Principal Problem:Septic shock    Inpatient consult to Gastroenterology  Consult performed by: Colton Mercado MD  Consult ordered by: Padmaja Clay MD      Subjective:     HPI:   Ms Zelaya is a 64yo PMHx HTN, HLD, WILMA, CKD, recent L4-5 posterior decompression and fusion on 03/20 presented to [facility] [place] on 04/05 with abd pain, nausea, diarrhea.     GI consulted for C diff.    Initially admitted to ICU for sepsis, transferred to hospital medicine 04/08. Reported 7BM/d. No hematochezia.   Colonoscopy 03/2022 with reported normal findings--report in Care Everywhere    Initial hospital course notable for CTAP with diffuse wall thickening of pan-colon and rectum. Metallic density noted in cecum. C diff antigen positive, EIA negative, PCR toxin positive. Initiated on po vanco with improvement.     VS /24h unremarkable. CBC notable for WBC 13-->4. Hgb fluctuating 8-10. Plts wnl. BMP w/ BUN/ Cr 23/ 1.2. LFTs unremarkable largely.      Objective:     Vitals:    04/13/23 0839   BP: 135/77   Pulse: 99   Resp: 20   Temp: 98.9 °F (37.2 °C)         Constitutional:  not in acute distress and well developed  HENT: Head: Normal, normocephalic, atraumatic.  Eyes: conjunctiva clear and sclera nonicteric  GI: soft, non-tender, without masses or organomegaly  Musculoskeletal: no muscular tenderness noted  Skin: normal color  Neurological: alert, oriented x3  Psychiatric: mood and affect are within normal limits, pt is a good historian; no memory problems were noted    Significant Labs:  Reviewed the following pertinent laboratory tests.    Significant Imaging:        Assessment/Plan:     64yo PMHx HTN, HLD, WILMA, CKD,  recent L4-5 posterior decompression and fusion on 03/20 presented to [facility] ED on 04/05 with abd pain, nausea, diarrhea.     GI consulted for C diff.    Problem List:  C diff infection    Recommendations:  Recommend complete full treatment course of po vancomycin  Follow up in GI clinic    Thank you for involving us in the care of Cristel Zelaya. Please call with any additional questions, concerns or changes in the patient's clinical status. We will sign off.    Colton Mercado MD  Gastroenterology Fellow PGY VI  [facility]

## 2023-04-13 NOTE — DISCHARGE SUMMARY
[facility]  Discharge Summary      Patient Name: Cristel Zelaya  MRN: [id number]  Admission Date: 4/5/2023  Hospital Length of Stay: 8 days  Discharge Date and Time:  04/13/2023 3:29 PM  Attending Physician: Padmaja Clay MD   Discharging Provider: Padmaja Clay MD  Primary Care Provider: Primary Doctor No        HPI:       Cristel Zelaya is a 63 year old female with a PMH of HTN, WILMA, CKD, and recent L4-5 posterior decompression and fusion on 3/20 who presented to the ED with abdominal pain, nausea, diarrhea, and weakness. Daughter provided most history on exam. Patient underwent a L4-5 posterior decompression and fusion on 3/20 for chronic LBP TX. She subsequently was hospitalized from 3/20-3/27, followed by inpatient rehab from 3/27-3/31. Per daughter, there were several other patients at the facility who developed diarrhea and had to be admitted.After discharge, she has been staying with her daughter in [place]. She reports that she had had diarrhea over the past few days with 6-7 loose bowel movements/day. No blood reported in her stool. She has also had nausea without vomiting. This morning she developed epigastric abdominal pain which she describes as 8/10, and squeezing. Daughter reports that she has been drinking pedialyte, but over the past day has had decreased po intake, last intake 5:30PM with increased weakness and fatigue. Daughter grew concerned when she became less responsive and seemed more dehydrated with cracked lips at home. No fevers/chills, URI symptoms, urinary symptoms reported.      On arrival, patient with leukocytosis of 13.11, LA increased to 2.4. Blood gas with pH 7.345, pCO2 31.9, pO2 28, HCO3 17.4.  Hyponatremic to 123, hypochloremic to 90, hyperphosphatemia to 6.1. Decreased bicarb of 16. Cr/BUN increased to 7.0/117, baseline Cr ~1.  In the ED, she was given 2 L IV fluid and started on zosyn, metronizole, and po vancomycin given C.  Diff concerns given history. Given persistent hypotension 90s/50s despite fluid resuscitation, patient started on levophed. Patient admitted to the MICU.     * No surgery found *      Hospital Course: \    The patient was admitted in septic shock ultimately found to have C diff colitis.  Systemic antibiotics were discontinued and oral vancomycin initiated.  Throughout this period of time she required significant electrolyte replacement therapy.  Over the hospitalization, she is had significant reduction in number of bowel movements to relatively 3 per day from initially roughly 12 in 24 hours.  Abdominal pain is improving and she is tolerating sufficient oral intake to sustain herself off IV fluid support.  She will be discharged to complete a 14 day course of oral vancomycin with start date beginning on the end date of systemic antibiotics.    Fecal calprotectin noticed to be elevated.  GI consulted and they recommended completion of therapy for C diff and then for her to follow-up in clinic thereafter.  Ambulatory referral made.      Septic shock  2/2 c diff colitis  Admitted from rehab center w/ abdominal pain, diarrhea and confusion. Hypotensive in the ED. Labs w/ mild leukocytosis, anemia, Na 123, HCO3 16, , Cr 7.0, phos 6.1. Received IVFs in the ED along w/ BSabx. LA 2.4 -->2.2. Admitted to MICU for shock requiring vasopressors. Patient started on IV vanc/zosyn for concern for coliitis, as well as PO vanc for presumed C diff. Able to wean off pressors and patient stepped down 4/7.  Septic shock with need for vasopressors, MIKAEL previously     -treat underlying cause (c diff)  -f/u cultures  4/11: stopping iVF. Follow off.   4/12: mild compromise of renal function; follow     Hypokalemia  Due to continued diarrhea and possible post ATN diuresis  - replacing PO     S/P lumbar fusion  S/p L4-5 posterior decompression and fusion on 3/20 and admitted to rehab 3/27-3/31  - MRI unremarkable for infection source    - pain control  - PT/OT  -home health has been recommended     Hypoalbuminemia  - boost TIDWM        Hyperphosphatemia  Resolved  - Phos 6.1 on admission in the setting of MIKAEL, now resolved with improvement in MIKAEL     Increased anion gap metabolic acidosis  - in setting of c diff colitis and prerenal MIKAEL  - improving   - started on sodium bicarb in ICU, will discontinue now that co2 improving and ag resolved)  -resolved following fluid resuscitation     Hyponatremia  - Na 123 on admit, currently 135  - in the setting of hypovolemia given ongoing diarrhea  - improvement w/ volume resuscitation     Anemia  - Hgb 9.3, 11.1 on 2/6 as per care everywhere records  - stool occult blood positive, but no melena, hematochezia--> suspect 2/2 to c diff infection  - monitor for signs of bleeding, daily labs       MIKAEL (acute kidney injury)  Patient with acute kidney injury likely due to IVVD/dehydration and acute tubular necrosis MIKAEL is currently improving. Labs reviewed- Renal function/electrolytes with Estimated Creatinine Clearance: 26.5 mL/min (A) (based on SCr of 2.1 mg/dL (H)). according to latest data. Monitor urine output and serial BMP and adjust therapy as needed. Avoid nephrotoxins and renally dose meds for GFR listed above.      - Baseline Cr 1.0, Cr 7.0 on admission --> now 2.1  - continue with mIVF given continued diarrhea still on 4/10  - avoid nephrotoxic agents  -4/11: follow off IVF  -4/12: Mild compromise of renal function. Follow  -4/13:  Oral intake improving.  No further compromise in renal function of (slight improvement) despite being off IVF support.        Generalized abdominal pain  - due to C diff, monitor for improvement     C. difficile colitis  Septic shock 2/2 c diff. CT A/P with colitis on admission and started on PO vanc. C diff collected after ICU transfer and returned + so IV vanc/zosyn discontinued. C diff antigen positive, toxin negative, PCR positive  - continue Vancomycin PO likely course  for 14 days as above  - blood cultures NGTD X 2  - CLD, advance as tolerated; 4/11: advancing to full  - C diff precautions        Elevated fecal calprotectin   -outpatient GI follow-up        VTE Risk Mitigation (From admission, onward)                         Consults:   Consults (From admission, onward)          Status Ordering Provider     Inpatient consult to Gastroenterology  Once        Provider:  (Not yet assigned)    Completed DONALD RANDOLPH     Inpatient consult to PICC team ([facility])  Once        Provider:  (Not yet assigned)    Completed ALANNA SEWELL     Inpatient consult to Physical Medicine Rehab  Once        Provider:  Venkata Serrano MD    Completed DONALD RANDOLPH     Inpatient consult to Midline team  Once        Provider:  (Not yet assigned)    Completed KAREN MÁRQUEZ     Inpatient consult to Critical Care Medicine  Once        Provider:  (Not yet assigned)    Completed ERIC RIGGINS            Final Active Diagnoses:    Diagnosis Date Noted POA    PRINCIPAL PROBLEM:  Septic shock [A41.9, R65.21] 04/05/2023 Yes    Hypokalemia [E87.6] 04/08/2023 Yes    C. difficile colitis [A04.72] 04/05/2023 Yes    Generalized abdominal pain [R10.84] 04/05/2023 Yes    MIKAEL (acute kidney injury) [N17.9] 04/05/2023 Yes    Anemia [D64.9] 04/05/2023 Yes    Hyponatremia [E87.1] 04/05/2023 Yes    Increased anion gap metabolic acidosis [E87.29] 04/05/2023 Yes    Hyperphosphatemia [E83.39] 04/05/2023 Yes    Hypoalbuminemia [E88.09] 04/05/2023 Yes    S/P lumbar fusion [Z98.1] 04/05/2023 Not Applicable      Problems Resolved During this Admission:      Discharged Condition: stable    Disposition: Home-Health Care c    Follow Up:   Follow-up Information       Primary Doctor No Follow up in 1 week(s).    Why: Symptom monitoring, labs, vitals. Discuss resuming HCTZ                         Patient Instructions:      Ambulatory referral/consult to Gastroenterology   Standing Status: Future   Referral Priority:  Routine Referral Type: Consultation   Referral Reason: Specialty Services Required   Requested Specialty: Gastroenterology   Number of Visits Requested: 1     Notify your health care provider if you experience any of the following:  temperature >100.4     Notify your health care provider if you experience any of the following:  persistent nausea and vomiting or diarrhea     Notify your health care provider if you experience any of the following:  severe uncontrolled pain     Notify your health care provider if you experience any of the following:  difficulty breathing or increased cough     Notify your health care provider if you experience any of the following:  severe persistent headache     Notify your health care provider if you experience any of the following:  worsening rash     Notify your health care provider if you experience any of the following:  persistent dizziness, light-headedness, or visual disturbances     Notify your health care provider if you experience any of the following:  increased confusion or weakness     Notify your health care provider if you experience any of the following:     Medications:  Reconciled Home Medications:      Medication List        START taking these medications      ondansetron 4 MG tablet  Commonly known as: ZOFRAN  Take 1 tablet (4 mg total) by mouth every 6 (six) hours as needed for Nausea.     oxyCODONE-acetaminophen 5-325 mg per tablet  Commonly known as: PERCOCET  Take 1 tablet by mouth every 6 (six) hours as needed for Pain.     vancomycin 25 mg/mL solution  Take 5 mLs (125 mg total) by mouth every 6 (six) hours. for 11 days            CHANGE how you take these medications      hydroCHLOROthiazide 12.5 mg capsule  Commonly known as: MICROZIDE  Take 1 capsule (12.5 mg total) by mouth once daily. See PCP in one week. Hold this medicine until you see your PCP  What changed: additional instructions            CONTINUE taking these medications      ALPRAZolam 0.5 MG  tablet  Commonly known as: XANAX  Take 0.5 mg by mouth nightly as needed.     dorzolamide 2 % ophthalmic solution  Commonly known as: TRUSOPT  Place 1 drop into both eyes 2 (two) times a day.     gabapentin 300 MG capsule  Commonly known as: NEURONTIN  Take 300 mg by mouth 3 (three) times daily.     latanoprost 0.005 % ophthalmic solution  Place 1 drop into both eyes every evening.     lisinopriL 10 MG tablet  Take 10 mg by mouth 2 (two) times daily.     rosuvastatin 10 MG tablet  Commonly known as: CRESTOR  Take 10 mg by mouth every evening.     tiZANidine 4 MG tablet  Commonly known as: ZANAFLEX  Take 4 mg by mouth 3 (three) times daily.     traZODone 50 MG tablet  Commonly known as: DESYREL  Take 50 mg by mouth every evening.            STOP taking these medications      diclofenac 50 MG EC tablet  Commonly known as: VOLTAREN                Pending Diagnostic Studies:       Procedure Component Value Units Date/Time    Stool Exam-Ova,Cysts,Parasites [[id number]] Collected: 04/07/23 0140    Order Status: Sent Lab Status: In process Updated: 04/07/23 0211    Specimen: Stool     Urinalysis, Reflex to Urine Culture Urine, Clean Catch [[id number]] Collected: 04/05/23 1817    Order Status: Sent Lab Status: In process Updated: 04/05/23 1817    Specimen: Urine           Indwelling Lines/Drains at time of discharge:   Lines/Drains/Airways       None                   Time spent on the discharge of patient: 35 minutes         Padmaja Clay MD  Department of Hospital Medicine  [facility] - Telemetry Stepdown

## 2023-04-13 NOTE — PLAN OF CARE
Problem: Occupational Therapy  Goal: Occupational Therapy Goal  Description: Goals to be met by: 4/23/23     Patient will increase functional independence with ADLs by performing:    UE Dressing with Modified [place].  LE Dressing with Stand-by Assistance.  Grooming while standing at sink with Modified [place].  Toileting from bedside commode with Contact Guard Assistance for hygiene and clothing management.   Toilet transfer to bedside commode with Contact Guard Assistance.  Upper extremity exercise program 10 reps per handout, with supervision.    Outcome: Ongoing, Progressing   Christian Carlos OTR/L

## 2023-04-13 NOTE — PLAN OF CARE
Problem: Infection  Goal: Absence of Infection Signs and Symptoms  Outcome: Ongoing, Progressing     Problem: Adult Inpatient Plan of Care  Goal: Plan of Care Review  Outcome: Ongoing, Progressing  Goal: Patient-Specific Goal (Individualized)  Outcome: Ongoing, Progressing  Goal: Absence of Hospital-Acquired Illness or Injury  Outcome: Ongoing, Progressing  Goal: Optimal Comfort and Wellbeing  Outcome: Ongoing, Progressing  Goal: Readiness for Transition of Care  Outcome: Ongoing, Progressing     Problem: Fluid and Electrolyte Imbalance (Acute Kidney Injury/Impairment)  Goal: Fluid and Electrolyte Balance  Outcome: Ongoing, Progressing     Problem: Oral Intake Inadequate (Acute Kidney Injury/Impairment)  Goal: Optimal Nutrition Intake  Outcome: Ongoing, Progressing     Problem: Renal Function Impairment (Acute Kidney Injury/Impairment)  Goal: Effective Renal Function  Outcome: Ongoing, Progressing     Problem: Skin Injury Risk Increased  Goal: Skin Health and Integrity  Outcome: Ongoing, Progressing     Problem: Fall Injury Risk  Goal: Absence of Fall and Fall-Related Injury  Outcome: Ongoing, Progressing     Problem: Adjustment to Illness (Sepsis/Septic Shock)  Goal: Optimal Coping  Outcome: Ongoing, Progressing     Problem: Bleeding (Sepsis/Septic Shock)  Goal: Absence of Bleeding  Outcome: Ongoing, Progressing     Problem: Glycemic Control Impaired (Sepsis/Septic Shock)  Goal: Blood Glucose Level Within Desired Range  Outcome: Ongoing, Progressing     Problem: Infection Progression (Sepsis/Septic Shock)  Goal: Absence of Infection Signs and Symptoms  Outcome: Ongoing, Progressing     Problem: Nutrition Impaired (Sepsis/Septic Shock)  Goal: Optimal Nutrition Intake  Outcome: Ongoing, Progressing     Pt in bed awake. VSS and WNL. No signs of distress. All needs met. Bed side table and call light in reach.

## 2023-04-13 NOTE — PT/OT/SLP PROGRESS
"Physical Therapy Treatment    Patient Name:  Cristel Zelaya   MRN:  [id number]    Recent Surgery: * No surgery found *      Recommendations:     Discharge Recommendations:  home health PT   Discharge Equipment Recommendations: shower chair   Barriers to discharge: None    Highest Level of Mobility: Gait 2 trials x35'  Assistance Required: SBA w/ RW    Assessment:     Cristel Zelaya is a 63 y.o. female admitted with a medical diagnosis of Septic shock.    Pt met with HOB elevated and agreeable to PT treatment. Today's PT treatment focus was on continued gait training to improve activity tolerance. Pt currently requires SBA to ambulate with RW and has no overt LOB during session. She does fatigue rapidly and requires rest breaks during session.     Pt is progressing towards acute PT goals appropriately and continues to benefit from acute PT sessions. After hospital discharge, pt would benefit from HHPT to maximize rehab potential.    Rehab Prognosis: Good; patient would benefit from acute skilled PT services to address these deficits and reach maximum level of function.      Plan:     During this hospitalization, patient to be seen 3 x/week to address the identified rehab impairments via gait training, therapeutic activities, therapeutic exercises and progress toward the following goals:    Plan of Care Expires:  05/10/23    This plan of care has been discussed with the patient/caregiver, who was included in its development and is in agreement with the identified goals and treatment plan.     Subjective     Communicated with RN prior to session.  Patient agreeable to participate.     Pain/Comfort:  Pain Rating 1: 10/10  Location - Orientation 1: generalized  Location 1: abdomen  Pain Addressed 1: Reposition, Distraction, Nurse notified  Pain Rating Post-Intervention 1: 10/10    Chief Complaint: Septic shock  Patient/Family Comments/goals: "I've been getting [place] [facility] [place]      Objective:     Patient " found HOB elevated with telemetry  upon PT entry to room.    General Precautions: Standard, fall, special contact   Orthopedic Precautions:N/A   Braces: N/A         Exams:    Cognition:  Patient is oriented to Person, Place, Time, Situation  Follows multistep  commands  Insight to deficits/safety awareness: intact    Functional Mobility:    Bed Mobility:  Supine to Sit: Stand-by Assistance on L side of bed  Sit to Supine: Stand-by Assistance  Scooting anteriorly to EOB to plant feet on floor: Stand-by Assistance    Transfers:   Sit to Stand Transfer: Stand-by Assistance  from EOB with RW AD   X3 trials  Pt initially with slouched posture, was able to correct with cues from therapist  Bed to Chair: Activity did not occur , pt refused             Gait:  Patient received gait training in room 2 trials x 35 feet with Stand-by Assistance and rolling walker  Gait Assessment: narrow base of support, flexed posture, and decreased isaías  Gait Pattern Observed: Step-through reciprocal gait  Comments: All lines remained intact throughout ambulation trial, gait belt utilized. Pt with no overt LOB, but requires a seated rest break between trials 2/2 fatigue.    Balance:  Static Sit:   Stand-By Assist at EOB  Normal: Patient able to maintain steady balance without handhold support.  Static Stand:   Stand-By Assist with Rolling walker  Normal: Patient able to maintain steady balance without handhold support.  Dynamic Stand:  Stand-By Assist with Rolling walker    Therapeutic Activities/Exercises     Patient assisted with functional mobility as noted above  Patient educated on the importance of early mobility to prevent functional decline during hospital stay  Patient was instructed to utilize staff assistance for mobility/transfers.  Patient is appropriate to transfer with SBA and RN/PCT assist  Patient educated on PT POC and role of PT in acute care  White board updated regarding patient's safest level of mobility with staff  assistance, RN also updated.     AM-PAC 6 CLICK MOBILITY  Turning over in bed (including adjusting bedclothes, sheets and blankets)?: 4  Sitting down on and standing up from a chair with arms (e.g., wheelchair, bedside commode, etc.): 4  Moving from lying on back to sitting on the side of the bed?: 4  Moving to and from a bed to a chair (including a wheelchair)?: 3  Need to walk in hospital room?: 3  Climbing 3-5 steps with a railing?: 3  Basic Mobility Total Score: 21     Patient left HOB elevated with all lines intact, call button in reach, and RN notified.        History/Goals:     PAST MEDICAL HISTORY:  History reviewed. No pertinent past medical history.    Past Surgical History:   Procedure Laterality Date    BACK SURGERY      CHOLECYSTECTOMY         GOALS:   Multidisciplinary Problems       Physical Therapy Goals          Problem: Physical Therapy    Goal Priority Disciplines Outcome Goal Variances Interventions   Physical Therapy Goal     PT, PT/OT Ongoing, Progressing     Description: Goals to be met by: 23     Patient will increase functional independence with mobility by performin. Supine to sit with Stand-by Assistance  2. Sit to supine with Stand-by Assistance  3. Sit to stand transfer with Stand-by Assistance  4. Bed to chair transfer with Stand-by Assistance using LRAD as needed  5. Gait  x 100 feet with Stand-by Assistance using LRAD as needed.   6. Lower extremity exercise program x15 reps per handout, with assistance as needed                         Time Tracking:     PT Received On: 23  PT Start Time: 958     PT Stop Time: 1021  PT Total Time (min): 23 min     Billable Minutes: Gait Training 23      Shannon Matias, PT  2023  Pager# [phone]

## 2023-04-14 NOTE — PLAN OF CARE
[facility] - Tele[facility] Step[facility]  Discharge Final Note    Primary Care Provider: Primary Doctor No    Expected Discharge Date: 4/13/2023      Final Discharge Note (most recent)       Final Note - 04/14/23 1417          Final Note    Assessment Type Final Discharge Note     Anticipated Discharge Disposition Home or Self Care     What phone number can be called within the next 1-3 days to see how you are doing after discharge? [phone]        Post-Acute Status    Post-Acute Authorization Other     Home Health Status Discharge Plan Changed                     Important Message from Medicare             Contact Info       No, Primary Doctor   Relationship: PCP - General        Next Steps: Follow up in 1 week(s)    Instructions: Symptom monitoring, labs, vitals. Discuss resuming HCTZ    Gastroenterology        Next Steps: Schedule an appointment as soon as possible for a visit today    Instructions: Make an appointment in one to two weeks for hospital follow up.            Wendie Becker RN  Ext [phone]

## 2023-04-14 NOTE — PLAN OF CARE
CM unable to arrange home health services at this time. Patient's [employer] plan only provides services in her state of residence [place]. As patient will be staying with her daughter in [place], HH is not a covered service. Call placed to patient's daughter Will ([phone]) to inform her of above, she expressed understanding.    Wendie Becker RN  Ext [phone]

## 2023-04-17 ENCOUNTER — TELEPHONE (OUTPATIENT)
Dept: ENDOSCOPY | Facility: HOSPITAL | Age: 63
End: 2023-04-17
Payer: MEDICARE

## 2023-04-18 NOTE — TELEPHONE ENCOUNTER
----- Message from Colton Mercado MD sent at 4/13/2023  4:21 PM CDT -----  Needs non urgent GI clinic follow up

## 2023-04-20 LAB — O+P STL MICRO: NORMAL

## 2023-05-14 NOTE — PROGRESS NOTES
[facility]  Progress Note    Patient Name: Cristel Zelaya  MRN: [id number]  Patient Class: IP- Inpatient   Admission Date: 4/5/2023  Length of Stay: 5 days  Attending Physician: Padmaja Clay MD  Primary Care Provider: Primary Doctor No        Subjective:     Principal Problem:Septic shock        HPI:  Cristel Zelaya is a 63 year old female with a PMH of HTN, WILMA, CKD, and recent L4-5 posterior decompression and fusion on 3/20 who presented to the ED with abdominal pain, nausea, diarrhea, and weakness. Daughter provided most history on exam. Patient underwent a L4-5 posterior decompression and fusion on 3/20 for chronic LBP TX. She subsequently was hospitalized from 3/20-3/27, followed by inpatient rehab from 3/27-3/31. Per daughter, there were several other patients at the facility who developed diarrhea and had to be admitted.After discharge, she has been staying with her daughter in [place]. She reports that she had had diarrhea over the past few days with 6-7 loose bowel movements/day. No blood reported in her stool. She has also had nausea without vomiting. This morning she developed epigastric abdominal pain which she describes as 8/10, and squeezing. Daughter reports that she has been drinking pedialyte, but over the past day has had decreased po intake, last intake 5:30PM with increased weakness and fatigue. Daughter grew concerned when she became less responsive and seemed more dehydrated with cracked lips at home. No fevers/chills, URI symptoms, urinary symptoms reported.      On arrival, patient with leukocytosis of 13.11, LA increased to 2.4. Blood gas with pH 7.345, pCO2 31.9, pO2 28, HCO3 17.4.  Hyponatremic to 123, hypochloremic to 90, hyperphosphatemia to 6.1. Decreased bicarb of 16. Cr/BUN increased to 7.0/117, baseline Cr ~1.  In the ED, she was given 2 L IV fluid and started on zosyn, metronizole, and po vancomycin given C. Diff concerns given  Goal Outcome Evaluation:      Pt rested during shift. Tylenol given for headaches with relief stated by pt. Medications reviewed, bottle sealed and sent to pharmacy, pt is educated on reason why staff can't keep meds in the room, she can retrieved meds at discharge. Pt verbalized understanding. Vitals stable during shift, pt remains on room air. Call light within reach, will cont to monitor.                   history. Given persistent hypotension 90s/50s despite fluid resuscitation, patient started on levophed. Patient admitted to the MICU for shock requiring pressor support.    ICU course:     CT A/P concerning with colitis. C diff and stool studies ordered, C diff not collected while in ICU. MIKAEL and electrolyte abnormalities slowly improving with IVF. MRI lumbar spine without concern for infection. Patient able to be weaned off pressors and stepped down to medicine on 4/7.      Overview/Hospital Course:  Mrs. Zelaya was stepped down to hospital medicine for continued treatment of colitis and electrolyte abnormalities.  She continues to have diarrhea and significant abdominal pain.    No chest pain, shortness of breath, lightheadedness. Tolerating oral intake.     Patient still having more than 10 bowel movements a day.  Still with abdominal discomfort.    NAD, AO3  NC, AT  RRR  CTAB  S, mild diffuse tenderness without rebound or guarding, ND+BS  No edema   PERRL    Vitals, labs and radiographs from past 24h reviewed and personally interpreted.       Assessment/Plan:      * Septic shock  2/2 c diff colitis  Admitted from rehab center w/ abdominal pain, diarrhea and confusion. Hypotensive in the ED. Labs w/ mild leukocytosis, anemia, Na 123, HCO3 16, , Cr 7.0, phos 6.1. Received IVFs in the ED along w/ BSabx. LA 2.4 -->2.2. Admitted to MICU for shock requiring vasopressors. Patient started on IV vanc/zosyn for concern for coliitis, as well as PO vanc for presumed C diff. Able to wean off pressors and patient stepped down 4/7.  Septic shock with need for vasopressors, MIKAEL previously  -continue fluids  -treat underlying cause (c diff)  -f/u cultures    Hypokalemia  Due to continued diarrhea and possible post ATN diuresis  - replacing PO    S/P lumbar fusion  S/p L4-5 posterior decompression and fusion on 3/20 and admitted to rehab 3/27-3/31  - MRI unremarkable for infection source   - pain control  - PT/OT  - will  likely need to return to rehab    Hypoalbuminemia  - boost TIDWM      Hyperphosphatemia  Resolved  - Phos 6.1 on admission in the setting of MIKAEL, now resolved with improvement in MIKAEL    Increased anion gap metabolic acidosis  - in setting of c diff colitis and prerenal MIKAEL  - improving   - started on sodium bicarb in ICU, will discontinue now that co2 improving and ag resolved)    Hyponatremia  - Na 123 on admit, currently 135  - in the setting of hypovolemia given ongoing diarrhea  - improvement w/ volume resuscitation    Anemia  - Hgb 9.3, 11.1 on 2/6 as per care everywhere records  - stool occult blood positive, but no melena, hematochezia--> suspect 2/2 to c diff infection  - monitor for signs of bleeding, daily labs  - if any evidence of bleeding, consider GI consult    MIKAEL (acute kidney injury)  Patient with acute kidney injury likely due to IVVD/dehydration and acute tubular necrosis MIKAEL is currently improving. Labs reviewed- Renal function/electrolytes with Estimated Creatinine Clearance: 26.5 mL/min (A) (based on SCr of 2.1 mg/dL (H)). according to latest data. Monitor urine output and serial BMP and adjust therapy as needed. Avoid nephrotoxins and renally dose meds for GFR listed above.     - Baseline Cr 1.0, Cr 7.0 on admission --> now 2.1  - continue with mIVF given continued diarrhea still on 4/10  - avoid nephrotoxic agents        Generalized abdominal pain  - due to C diff, monitor for improvement    C. difficile colitis  Septic shock 2/2 c diff. CT A/P with colitis on admission and started on PO vanc. C diff collected after ICU transfer and returned + so IV vanc/zosyn discontinued. C diff antigen positive, toxin negative, PCR positive  - continue Vancomycin PO likely course 10-14 days  - blood cultures NGTD X 2  - continue mIVF  - CLD, advance as tolerated  - C diff precautions        VTE Risk Mitigation (From admission, onward)           Ordered     heparin (porcine) injection 5,000 Units  Every 8  hours         04/06/23 1335     Place sequential compression device  Until discontinued         04/05/23 2014     IP VTE LOW RISK PATIENT  Once         04/05/23 2014                    Discharge Planning   DAISY: 4/14/2023     Code Status: Full Code   Is the patient medically ready for discharge?: No    Reason for patient still in hospital (select all that apply): Treatment  Discharge Plan A: Home Health, Home with family                  Padmaja Clay MD  Department of Hospital Medicine   [facility] - Telemetry Stepdown

## 2023-07-10 PROBLEM — A41.9 SEPTIC SHOCK: Status: RESOLVED | Noted: 2023-04-05 | Resolved: 2023-07-10

## 2023-07-10 PROBLEM — N17.9 AKI (ACUTE KIDNEY INJURY): Status: RESOLVED | Noted: 2023-04-05 | Resolved: 2023-07-10

## 2023-07-10 PROBLEM — R65.21 SEPTIC SHOCK: Status: RESOLVED | Noted: 2023-04-05 | Resolved: 2023-07-10
